# Patient Record
Sex: MALE | Race: WHITE | URBAN - METROPOLITAN AREA
[De-identification: names, ages, dates, MRNs, and addresses within clinical notes are randomized per-mention and may not be internally consistent; named-entity substitution may affect disease eponyms.]

---

## 2017-04-19 ENCOUNTER — OFFICE VISIT (OUTPATIENT)
Dept: FAMILY MEDICINE | Facility: CLINIC | Age: 82
End: 2017-04-19
Payer: MEDICARE

## 2017-04-19 VITALS
HEIGHT: 71 IN | WEIGHT: 177 LBS | OXYGEN SATURATION: 99 % | TEMPERATURE: 98.4 F | DIASTOLIC BLOOD PRESSURE: 80 MMHG | BODY MASS INDEX: 24.78 KG/M2 | HEART RATE: 90 BPM | SYSTOLIC BLOOD PRESSURE: 136 MMHG

## 2017-04-19 DIAGNOSIS — H61.20 WAX IN EAR: Primary | ICD-10-CM

## 2017-04-19 PROCEDURE — 99202 OFFICE O/P NEW SF 15 MIN: CPT | Mod: 25 | Performed by: FAMILY MEDICINE

## 2017-04-19 PROCEDURE — 69210 REMOVE IMPACTED EAR WAX UNI: CPT | Performed by: FAMILY MEDICINE

## 2017-04-19 NOTE — MR AVS SNAPSHOT
"              After Visit Summary   2017    Chuy Barrera    MRN: [id number]           Patient Information     Date Of Birth          1932        Visit Information        Provider Department      2017 1:00 PM Piero Wallace MD [facility] [place]        Today's Diagnoses     Wax in ear    -  1       Follow-ups after your visit        Who to contact     If you have questions or need follow up information about today's clinic visit or your schedule please contact [facility] [place] directly at [phone].  Normal or non-critical lab and imaging results will be communicated to you by griddighart, letter or phone within 4 business days after the clinic has received the results. If you do not hear from us within 7 days, please contact the clinic through griddighart or phone. If you have a critical or abnormal lab result, we will notify you by phone as soon as possible.  Submit refill requests through [employer] or call your pharmacy and they will forward the refill request to us. Please allow 3 business days for your refill to be completed.          Additional Information About Your Visit        MyChart Information     SportXast lets you send messages to your doctor, view your test results, renew your prescriptions, schedule appointments and more. To sign up, go to [email or web address] . Click on \"Log in\" on the left side of the screen, which will take you to the Welcome page. Then click on \"Sign up Now\" on the right side of the page.     You will be asked to enter the access code listed below, as well as some personal information. Please follow the directions to create your username and password.     Your access code is: [email or web address]  Expires: 2017  1:53 PM     Your access code will  in 90 days. If you need help or a new code, please call your [facility] or [phone].        Care EveryWhere ID     This is your Care EveryWhere ID. This could be used by other organizations to " "access your [place] medical records  [id number]        Your Vitals Were     Pulse Temperature Height Pulse Oximetry BMI (Body Mass Index)       90 98.4  F (36.9  C) (Tympanic) 5' 11\" (1.803 m) 99% 24.69 kg/m2        Blood Pressure from Last 3 Encounters:   04/19/17 136/80   02/03/12 120/64   09/27/11 136/64    Weight from Last 3 Encounters:   04/19/17 177 lb (80.3 kg)   02/03/12 165 lb (74.8 kg)   09/27/11 169 lb (76.7 kg)              We Performed the Following     REMOVE IMPACTED CERUMEN        Primary Care Provider Office Phone #    [facility] [place] [phone]       No address on file        Thank you!     Thank you for choosing [facility] [place]  for your care. Our goal is always to provide you with excellent care. Hearing back from our patients is one way we can continue to improve our services. Please take a few minutes to complete the written survey that you may receive in the mail after your visit with us. Thank you!             Your Updated Medication List - Protect others around you: Learn how to safely use, store and throw away your medicines at www.disposemymeds.org.      Notice  As of 4/19/2017  1:53 PM    You have not been prescribed any medications.      "

## 2017-04-19 NOTE — NURSING NOTE
"Chief Complaint   Patient presents with     Ear Problem       Initial /80  Pulse 90  Temp 98.4  F (36.9  C) (Tympanic)  Ht 5' 11\" (1.803 m)  Wt 177 lb (80.3 kg)  SpO2 99%  BMI 24.69 kg/m2 Estimated body mass index is 24.69 kg/(m^2) as calculated from the following:    Height as of this encounter: 5' 11\" (1.803 m).    Weight as of this encounter: 177 lb (80.3 kg).  Medication Reconciliation: complete     Nelsy Jara CMA      "

## 2017-04-19 NOTE — PROGRESS NOTES
SUBJECTIVE:                                                    Chuy Barrera is a 84 year old male who presents to clinic today for the following health issues:      Concern - sudden hearing loss     Onset: 2 episode since Sunday    Description:   No pain    Intensity: mild    Progression of Symptoms:      Accompanying Signs & Symptoms:         Previous history of similar problem:       Precipitating factors:   Worsened by:     Alleviating factors:  Improved by:        Therapies Tried and outcome:     Problem list and histories reviewed & adjusted, as indicated.  Additional history: as documented    Patient Active Problem List   Diagnosis     DIZZINESS AND GIDDINESS     Actinic keratosis     Sebaceous cyst     CARDIOVASCULAR SCREENING; LDL GOAL LESS THAN 160     Elevated homocysteine (H)     Advanced directives, counseling/discussion     Memory change     Past Surgical History:   Procedure Laterality Date     SURGICAL HISTORY OF -   4/30/99    Wide excision chest lesion     SURGICAL HISTORY OF -   4/20/99    Bx chest lesion, Dx melanoma     SURGICAL HISTORY OF -       s/p Hernia repair     SURGICAL HISTORY OF -   12/5/00    Bx chest lesion scar, neg.       Social History   Substance Use Topics     Smoking status: Never Smoker     Smokeless tobacco: Not on file     Alcohol use Yes      Comment: occas     Family History   Problem Relation Age of Onset     C.A.D. Maternal Grandfather          No current outpatient prescriptions on file.     No Known Allergies  Recent Labs   Lab Test  09/16/11   1009   A1C  5.4   LDL  114   HDL  63   TRIG  57   ALT  23   CR  0.76   GFRESTIMATED  >90   GFRESTBLACK  >90   POTASSIUM  4.6   TSH  1.36      BP Readings from Last 3 Encounters:   04/19/17 136/80   02/03/12 120/64   09/27/11 136/64    Wt Readings from Last 3 Encounters:   04/19/17 177 lb (80.3 kg)   02/03/12 165 lb (74.8 kg)   09/27/11 169 lb (76.7 kg)                    Reviewed and updated as needed this visit by clinical  "staff       Reviewed and updated as needed this visit by Provider         ROS:  Constitutional, HEENT, cardiovascular, pulmonary, gi and gu systems are negative, except as otherwise noted.    OBJECTIVE:                                                    /80  Pulse 90  Temp 98.4  F (36.9  C) (Tympanic)  Ht 5' 11\" (1.803 m)  Wt 177 lb (80.3 kg)  SpO2 99%  BMI 24.69 kg/m2  Body mass index is 24.69 kg/(m^2).  GENERAL: healthy, alert and no distress  HENT: normal cephalic/atraumatic, both ears: occluded with wax, nose and mouth without ulcers or lesions, oropharynx clear and oral mucous membranes moist  NECK: no adenopathy, no asymmetry, masses, or scars and thyroid normal to palpation  RESP: lungs clear to auscultation - no rales, rhonchi or wheezes  CV: regular rate and rhythm, normal S1 S2, no S3 or S4, no murmur, click or rub, no peripheral edema and peripheral pulses strong  ABDOMEN: soft, nontender, no hepatosplenomegaly, no masses and bowel sounds normal  MS: no gross musculoskeletal defects noted, no edema         ASSESSMENT/PLAN:                                                    Chuy was seen today for ear problem.    Diagnoses and all orders for this visit:    Wax in ear  -     REMOVE IMPACTED CERUMEN      Bilateral wax impacted, removed with ENT alligator forceps and flushed with water without complication   Encouraged him to keep using debrox qod until hearing improving     Piero Wallace MD  [facility]  "

## 2017-09-20 ENCOUNTER — OFFICE VISIT (OUTPATIENT)
Dept: FAMILY MEDICINE | Facility: CLINIC | Age: 82
End: 2017-09-20
Payer: MEDICARE

## 2017-09-20 VITALS
BODY MASS INDEX: 24.14 KG/M2 | DIASTOLIC BLOOD PRESSURE: 85 MMHG | HEART RATE: 73 BPM | WEIGHT: 172.4 LBS | TEMPERATURE: 98 F | RESPIRATION RATE: 16 BRPM | HEIGHT: 71 IN | SYSTOLIC BLOOD PRESSURE: 124 MMHG | OXYGEN SATURATION: 97 %

## 2017-09-20 DIAGNOSIS — Z23 NEED FOR PROPHYLACTIC VACCINATION AND INOCULATION AGAINST INFLUENZA: ICD-10-CM

## 2017-09-20 DIAGNOSIS — G30.1 LATE ONSET ALZHEIMER'S DISEASE WITH BEHAVIORAL DISTURBANCE (H): ICD-10-CM

## 2017-09-20 DIAGNOSIS — Z12.11 SCREEN FOR COLON CANCER: ICD-10-CM

## 2017-09-20 DIAGNOSIS — F02.818 LATE ONSET ALZHEIMER'S DISEASE WITH BEHAVIORAL DISTURBANCE (H): ICD-10-CM

## 2017-09-20 DIAGNOSIS — Z23 NEED FOR VACCINATION: ICD-10-CM

## 2017-09-20 DIAGNOSIS — Z00.00 HEALTH CARE MAINTENANCE: Primary | ICD-10-CM

## 2017-09-20 DIAGNOSIS — Z12.5 SCREENING FOR PROSTATE CANCER: ICD-10-CM

## 2017-09-20 LAB
ERYTHROCYTE [DISTWIDTH] IN BLOOD BY AUTOMATED COUNT: 13.8 % (ref 10–15)
HCT VFR BLD AUTO: 47 % (ref 40–53)
HGB BLD-MCNC: 15.6 G/DL (ref 13.3–17.7)
MCH RBC QN AUTO: 31.4 PG (ref 26.5–33)
MCHC RBC AUTO-ENTMCNC: 33.2 G/DL (ref 31.5–36.5)
MCV RBC AUTO: 95 FL (ref 78–100)
PLATELET # BLD AUTO: 239 10E9/L (ref 150–450)
RBC # BLD AUTO: 4.97 10E12/L (ref 4.4–5.9)
WBC # BLD AUTO: 6.9 10E9/L (ref 4–11)

## 2017-09-20 PROCEDURE — 90662 IIV NO PRSV INCREASED AG IM: CPT | Performed by: FAMILY MEDICINE

## 2017-09-20 PROCEDURE — G0438 PPPS, INITIAL VISIT: HCPCS | Performed by: FAMILY MEDICINE

## 2017-09-20 PROCEDURE — 90670 PCV13 VACCINE IM: CPT | Performed by: FAMILY MEDICINE

## 2017-09-20 PROCEDURE — G0008 ADMIN INFLUENZA VIRUS VAC: HCPCS | Performed by: FAMILY MEDICINE

## 2017-09-20 PROCEDURE — 80053 COMPREHEN METABOLIC PANEL: CPT | Performed by: FAMILY MEDICINE

## 2017-09-20 PROCEDURE — 36415 COLL VENOUS BLD VENIPUNCTURE: CPT | Performed by: FAMILY MEDICINE

## 2017-09-20 PROCEDURE — 83721 ASSAY OF BLOOD LIPOPROTEIN: CPT | Performed by: FAMILY MEDICINE

## 2017-09-20 PROCEDURE — G0103 PSA SCREENING: HCPCS | Performed by: FAMILY MEDICINE

## 2017-09-20 PROCEDURE — 85027 COMPLETE CBC AUTOMATED: CPT | Performed by: FAMILY MEDICINE

## 2017-09-20 PROCEDURE — G0009 ADMIN PNEUMOCOCCAL VACCINE: HCPCS | Performed by: FAMILY MEDICINE

## 2017-09-20 NOTE — PROGRESS NOTES
SUBJECTIVE:   Chuy Barrera is a 85 year old male who presents for Preventive Visit.    Are you in the first 12 months of your Medicare Part B coverage?  No    Healthy Habits:    Do you get at least three servings of calcium containing foods daily (dairy, green leafy vegetables, etc.)? 1    Amount of exercise or daily activities, outside of work: 0 day(s) per week    Problems taking medications regularly No    Medication side effects: No    Have you had an eye exam in the past two years? yes    Do you see a dentist twice per year? no    Do you have sleep apnea, excessive snoring or daytime drowsiness? Unsure, sleeps a lot during the day.     COGNITIVE SCREEN  1) Repeat 3 items (Banana, Sunrise, Chair)    2) Clock draw: ABNORMAL   3) 3 item recall: Recalls NO objects   Results: 0 items recalled: PROBABLE COGNITIVE IMPAIRMENT, **INFORM PROVIDER**    Mini-CogTM Copyright S Alex. Licensed by the author for use in [facility]; reprinted with permission (pato@.[facility]). All rights reserved.      Reviewed and updated as needed this visit by clinical staff         Reviewed and updated as needed this visit by Provider      Social History   Substance Use Topics     Smoking status: Never Smoker     Smokeless tobacco: Not on file     Alcohol use Yes      Comment: occas       The patient does not drink >3 drinks per day nor >7 drinks per week.    Today's PHQ-2 Score:   PHQ-2 ( 1999 Pfizer) 4/19/2017 2/3/2012   Q1: Little interest or pleasure in doing things 0 0   Q2: Feeling down, depressed or hopeless 0 0   PHQ-2 Score 0 0         Do you feel safe in your environment - Yes    Do you have a Health Care Directive?: Yes: Advance Directive has been received and scanned. 2012    Current providers sharing in care for this patient include:   Patient Care Team:  Denice Ophir Emily as PCP - General      Hearing impairment: Yes, Difficulty following a conversation in a noisy restaurant or crowded  room.    Ability to successfully perform activities of daily living: No, needs assistance with: phone, transportation, shopping, preparing meals, housework, bathing, laundry, medications and managing money     Fall risk:  Fallen 2 or more times in the past year?: No  Any fall with injury in the past year?: No      Home safety:  none identified      The following health maintenance items are reviewed in Epic and correct as of today:  Health Maintenance   Topic Date Due     TETANUS IMMUNIZATION (SYSTEM ASSIGNED)  09/05/1950     FALL RISK ASSESSMENT  09/05/1997     PNEUMOCOCCAL (1 of 2 - PCV13) 09/05/1997     ADVANCE DIRECTIVE PLANNING Q5 YRS  02/16/2017     INFLUENZA VACCINE (SYSTEM ASSIGNED)  09/01/2017     BP Readings from Last 3 Encounters:   09/20/17 124/85   04/19/17 136/80   02/03/12 120/64    Wt Readings from Last 3 Encounters:   09/20/17 172 lb 6.4 oz (78.2 kg)   04/19/17 177 lb (80.3 kg)   02/03/12 165 lb (74.8 kg)                  Patient Active Problem List   Diagnosis     DIZZINESS AND GIDDINESS     Actinic keratosis     Sebaceous cyst     CARDIOVASCULAR SCREENING; LDL GOAL LESS THAN 160     Elevated homocysteine (H)     Advanced directives, counseling/discussion     Memory change     Past Surgical History:   Procedure Laterality Date     SURGICAL HISTORY OF -   4/30/99    Wide excision chest lesion     SURGICAL HISTORY OF -   4/20/99    Bx chest lesion, Dx melanoma     SURGICAL HISTORY OF -       s/p Hernia repair     SURGICAL HISTORY OF -   12/5/00    Bx chest lesion scar, neg.       Social History   Substance Use Topics     Smoking status: Never Smoker     Smokeless tobacco: Not on file     Alcohol use Yes      Comment: occas     Family History   Problem Relation Age of Onset     C.A.D. Maternal Grandfather          No current outpatient prescriptions on file.     No Known Allergies  Recent Labs   Lab Test  09/16/11   1009   A1C  5.4   LDL  114   HDL  63   TRIG  57   ALT  23   CR  0.76   GFRESTIMATED   ">90   GFRESTBLACK  >90   POTASSIUM  4.6   TSH  1.36              Pneumonia Vaccine:Adults age 65+ who received Pneumovax (PPSV23) at 65 years or older: Should be given PCV13 > 1 year after their most recent PPSV23    ROS:  Constitutional, HEENT, cardiovascular, pulmonary, gi and gu systems are negative, except as otherwise noted.      OBJECTIVE:   /85 (BP Location: Left arm, Patient Position: Chair, Cuff Size: Adult Regular)  Pulse 73  Temp 98  F (36.7  C) (Tympanic)  Resp 16  Ht 5' 11\" (1.803 m)  Wt 172 lb 6.4 oz (78.2 kg)  SpO2 97%  BMI 24.04 kg/m2 Estimated body mass index is 24.04 kg/(m^2) as calculated from the following:    Height as of this encounter: 5' 11\" (1.803 m).    Weight as of this encounter: 172 lb 6.4 oz (78.2 kg).  EXAM:   GENERAL: healthy, alert and no distress  EYES: Eyes grossly normal to inspection, PERRL and conjunctivae and sclerae normal  HENT: ear canals and TM's normal, nose and mouth without ulcers or lesions  NECK: no adenopathy, no asymmetry, masses, or scars and thyroid normal to palpation  RESP: lungs clear to auscultation - no rales, rhonchi or wheezes  CV: regular rate and rhythm, normal S1 S2, no S3 or S4, no murmur, click or rub, no peripheral edema and peripheral pulses strong  ABDOMEN: soft, nontender, no hepatosplenomegaly, no masses and bowel sounds normal  MS: no gross musculoskeletal defects noted, no edema  BACK: no CVA tenderness, no paralumbar tenderness  PSYCH: mentation appears normal, affect normal/bright  LYMPH: no cervical, supraclavicular, axillary, or inguinal adenopathy    ASSESSMENT / PLAN:       ICD-10-CM    1. Health care maintenance Z00.00 CBC with platelets     Comprehensive metabolic panel (BMP + Alb, Alk Phos, ALT, AST, Total. Bili, TP)     PSA, screen     LDL cholesterol direct   2. Screening for prostate cancer Z12.5 PSA, screen   3. Screen for colon cancer Z12.11 Fecal colorectal cancer screen (FIT)   4. 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  Practitioner is supportive of decision.    COUNSELING:  Reviewed preventive health counseling, as reflected in patient instructions        Estimated body mass index is 24.69 kg/(m^2) as calculated from the following:    Height as of 4/19/17: 5' 11\" (1.803 m).    Weight as of 4/19/17: 177 lb (80.3 kg).     reports that he has never smoked. He does not have any smokeless tobacco history on file.        Appropriate preventive services were discussed with this patient, including applicable screening as appropriate for cardiovascular disease, diabetes, osteopenia/osteoporosis, and glaucoma.  As appropriate for age/gender, discussed screening for colorectal cancer, prostate cancer, breast cancer, and cervical cancer. Checklist reviewing preventive services available has been given to the patient.    Reviewed patients plan of care and provided an AVS. The Basic Care Plan (routine screening as documented in Health Maintenance) for Chuy meets the Care Plan requirement. This Care Plan has been established and reviewed with the Patient.    Counseling Resources:  ATP IV Guidelines  Pooled Cohorts Equation Calculator  Breast Cancer Risk Calculator  FRAX Risk Assessment  ICSI Preventive Guidelines  Dietary Guidelines for Americans, 2010  [employer] MyPlate  ASA Prophylaxis  Lung CA Screening    Piero Wallace MD  [facility]  "

## 2017-09-20 NOTE — LETTER
September 21, 2017      Chuy Barrera  [address]  [place] [address]    Dear Chuy and Allison,     Your lab results including complete blood cell counts/electrolyte balance and glucose/liver and kidney function/LDL cholesterol were all normal.  However, your prostate specific antigen is elevated around the borderline. I strongly recommend you to recheck it in 6 months for follow-up with us.    Thank you,                               Piero Wallace MD

## 2017-09-20 NOTE — MR AVS SNAPSHOT
After Visit Summary   9/20/2017    Chuy Barrera    MRN: [id number]           Patient Information     Date Of Birth          9/5/1932        Visit Information        Provider Department      9/20/2017 11:00 AM Piero Wallace MD [facility]        Today's [facility]     Health care maintenance    -  1    Screening for prostate cancer        Screen for colon cancer        Late onset Alzheimer's disease with behavioral disturbance        Need for vaccination        Need for prophylactic vaccination and inoculation against influenza          Care Instructions      Preventive Health Recommendations:       Male Ages 65 and over    Yearly exam:             See your health care provider every year in order to  o   Review health changes.   o   Discuss preventive care.    o   Review your medicines if your doctor has prescribed any.    Talk with your health care provider about whether you should have a test to screen for prostate cancer (PSA).    Every 3 years, have a diabetes test (fasting glucose). If you are at risk for diabetes, you should have this test more often.    Every 5 years, have a cholesterol test. Have this test more often if you are at risk for high cholesterol or heart disease.     Every 10 years, have a colonoscopy. Or, have a yearly FIT test (stool test). These exams will check for colon cancer.    Talk to with your health care provider about screening for Abdominal Aortic Aneurysm if you have a family history of AAA or have a history of smoking.  Shots:     Get a flu shot each year.     Get a tetanus shot every 10 years.     Talk to your doctor about your pneumonia vaccines. There are now two you should receive - Pneumovax (PPSV 23) and Prevnar (PCV 13).    Talk to your doctor about a shingles vaccine.     Talk to your doctor about the hepatitis B vaccine.  Nutrition:     Eat at least 5 servings of fruits and vegetables each day.     Eat whole-grain bread, whole-wheat pasta  and brown rice instead of white grains and rice.     Talk to your doctor about Calcium and Vitamin D.   Lifestyle    Exercise for at least 150 minutes a week (30 minutes a day, 5 days a week). This will help you control your weight and prevent disease.     Limit alcohol to one drink per day.     No smoking.     Wear sunscreen to prevent skin cancer.     See your dentist every six months for an exam and cleaning.     See your eye doctor every 1 to 2 years to screen for conditions such as glaucoma, macular degeneration and cataracts.  Preventive Health Recommendations:   Male Ages 65 and over    Yearly exam:             See your health care provider every year in order to  o   Review health changes.   o   Discuss preventive care.    o   Review your medicines if your doctor has prescribed any.  Talk with your health care provider about whether you should have a test to screen for prostate cancer (PSA).  Every 3 years, have a diabetes test (fasting glucose). If you are at risk for diabetes, you should have this test more often.  Every 5 years, have a cholesterol test. Have this test more often if you are at risk for high cholesterol or heart disease.   Every 10 years, have a colonoscopy. Or, have a yearly FIT test (stool test). These exams will check for colon cancer.  Talk to with your health care provider about screening for Abdominal Aortic Aneurysm if you have a family history of AAA or have a history of smoking.    Shots:   Get a flu shot each year.   Get a tetanus shot every 10 years.   Talk to your doctor about your pneumonia vaccines. There are now two you should receive - Pneumovax (PPSV 23) and Prevnar (PCV 13).   Talk to your doctor about a shingles vaccine.   Talk to your doctor about the hepatitis B vaccine.  Nutrition:   Eat at least 5 servings of fruits and vegetables each day.   Eat whole-grain bread, whole-wheat pasta and brown rice instead of white grains and rice.   Talk to your provider about Calcium  "and Vitamin D.   Lifestyle  Exercise for at least 150 minutes a week (30 minutes a day, 5 days a week). This will help you control your weight and prevent disease.   Limit alcohol to one drink per day.   No smoking.   Wear sunscreen to prevent skin cancer.   See your dentist every six months for an exam and cleaning.   See your eye doctor every 1 to 2 years to screen for conditions such as glaucoma, macular degeneration, cataracts, etc           Follow-ups after your visit        Future tests that were ordered for you today     Open Future Orders        Priority Expected Expires Ordered    Fecal colorectal cancer screen (FIT) Routine 10/11/2017 12/13/2017 9/20/2017            Who to contact     If you have questions or need follow up information about today's clinic visit or your schedule please contact [facility] [place] directly at [phone].  Normal or non-critical lab and imaging results will be communicated to you by phorushart, letter or phone within 4 business days after the clinic has received the results. If you do not hear from us within 7 days, please contact the clinic through phorushart or phone. If you have a critical or abnormal lab result, we will notify you by phone as soon as possible.  Submit refill requests through [employer] or call your pharmacy and they will forward the refill request to us. Please allow 3 business days for your refill to be completed.          Additional Information About Your Visit        [employer] Information     [employer] lets you send messages to your doctor, view your test results, renew your prescriptions, schedule appointments and more. To sign up, go to [email or web address] . Click on \"Log in\" on the left side of the screen, which will take you to the Welcome page. Then click on \"Sign up Now\" on the right side of the page.     You will be asked to enter the access code listed below, as well as some personal information. Please follow the directions to create your " "username and password.     Your access code is: [email or web address]  Expires: 2017  1:54 PM     Your access code will  in 90 days. If you need help or a new code, please call your [place] clinic or [phone].        Care EveryWhere ID     This is your Care EveryWhere ID. This could be used by other organizations to access your [place] medical records  [id number]        Your Vitals Were     Pulse Temperature Respirations Height Pulse Oximetry BMI (Body Mass Index)    73 98  F (36.7  C) (Tympanic) 16 5' 11\" (1.803 m) 97% 24.04 kg/m2       Blood Pressure from Last 3 Encounters:   17 124/85   17 136/80   12 120/64    Weight from Last 3 Encounters:   17 172 lb 6.4 oz (78.2 kg)   17 177 lb (80.3 kg)   12 165 lb (74.8 kg)              We Performed the Following     1st  Administration  [69007]     ADMIN INFLUENZA (For MEDICARE Patients ONLY) []     CBC with platelets     Comprehensive metabolic panel (BMP + Alb, Alk Phos, ALT, AST, Total. Bili, TP)     FLU VACCINE, INCREASED ANTIGEN, PRESV FREE, AGE 65+ [10011]     LDL cholesterol direct     Pneumococcal vaccine 13 valent PCV13 IM (Prevnar) [28478]     PSA, screen        Primary Care Provider Office Phone #    [facility] [phone]       No address on file        Equal Access to Services     YOKASTA RUCKER : Hadii aad ku hadasho Soomaali, waaxda luqadaha, qaybta kaalmada adeegyada, rafael garcía adesudarshan ruvalcaba . So [facility] [phone].    ATENCIÓN: Si habla español, tiene a griffin disposición servicios gratuitos de asistencia lingüística. Llame al [phone].    We comply with applicable federal civil rights laws and [place] laws. We do not discriminate on the basis of race, color, national origin, age, disability sex, sexual orientation or gender identity.            Thank you!     Thank you for choosing [facility] [place]  for your care. Our goal is always to provide you with " excellent care. Hearing back from our patients is one way we can continue to improve our services. Please take a few minutes to complete the written survey that you may receive in the mail after your visit with us. Thank you!             Your Updated Medication List - Protect others around you: Learn how to safely use, store and throw away your medicines at www.disposemymeds.org.      Notice  As of 9/20/2017  1:54 PM    You have not been prescribed any medications.

## 2017-09-20 NOTE — NURSING NOTE
"Chief Complaint   Patient presents with     Physical       Initial /85 (BP Location: Left arm, Patient Position: Chair, Cuff Size: Adult Regular)  Pulse 73  Temp 98  F (36.7  C) (Tympanic)  Resp 16  Ht 5' 11\" (1.803 m)  Wt 172 lb 6.4 oz (78.2 kg)  SpO2 97%  BMI 24.04 kg/m2 Estimated body mass index is 24.04 kg/(m^2) as calculated from the following:    Height as of this encounter: 5' 11\" (1.803 m).    Weight as of this encounter: 172 lb 6.4 oz (78.2 kg).  Medication Reconciliation: complete    Arin Baez MA  "

## 2017-09-20 NOTE — PROGRESS NOTES
Injectable Influenza Immunization Documentation    1.  Is the person to be vaccinated sick today?   No    2. Does the person to be vaccinated have an allergy to a component   of the vaccine?   Don't know    3. Has the person to be vaccinated ever had a serious reaction   to influenza vaccine in the past?   No    4. Has the person to be vaccinated ever had Guillain-Barré syndrome?   No    Form completed by Arin Baez MA

## 2017-09-21 LAB
ALBUMIN SERPL-MCNC: 4.1 G/DL (ref 3.4–5)
ALP SERPL-CCNC: 99 U/L (ref 40–150)
ALT SERPL W P-5'-P-CCNC: 29 U/L (ref 0–70)
ANION GAP SERPL CALCULATED.3IONS-SCNC: 9 MMOL/L (ref 3–14)
AST SERPL W P-5'-P-CCNC: 16 U/L (ref 0–45)
BILIRUB SERPL-MCNC: 1.6 MG/DL (ref 0.2–1.3)
BUN SERPL-MCNC: 16 MG/DL (ref 7–30)
CALCIUM SERPL-MCNC: 9.5 MG/DL (ref 8.5–10.1)
CHLORIDE SERPL-SCNC: 105 MMOL/L (ref 94–109)
CO2 SERPL-SCNC: 26 MMOL/L (ref 20–32)
CREAT SERPL-MCNC: 1.04 MG/DL (ref 0.66–1.25)
GFR SERPL CREATININE-BSD FRML MDRD: 68 ML/MIN/1.7M2
GLUCOSE SERPL-MCNC: 91 MG/DL (ref 70–99)
LDLC SERPL DIRECT ASSAY-MCNC: 87 MG/DL
POTASSIUM SERPL-SCNC: 4.5 MMOL/L (ref 3.4–5.3)
PROT SERPL-MCNC: 7.8 G/DL (ref 6.8–8.8)
PSA SERPL-ACNC: 7.55 UG/L (ref 0–4)
SODIUM SERPL-SCNC: 140 MMOL/L (ref 133–144)

## 2018-01-11 ENCOUNTER — TELEPHONE (OUTPATIENT)
Dept: FAMILY MEDICINE | Facility: CLINIC | Age: 83
End: 2018-01-11

## 2018-01-11 NOTE — TELEPHONE ENCOUNTER
Form for guardianship from aLyton and HERMELINDA Babcock received and given to Dr Wallace to review.   Kelin Burdick,

## 2018-01-16 ENCOUNTER — APPOINTMENT (OUTPATIENT)
Dept: GENERAL RADIOLOGY | Facility: CLINIC | Age: 83
DRG: 871 | End: 2018-01-16
Attending: EMERGENCY MEDICINE
Payer: MEDICARE

## 2018-01-16 ENCOUNTER — HOSPITAL ENCOUNTER (INPATIENT)
Facility: CLINIC | Age: 83
LOS: 1 days | Discharge: HOME HEALTH | DRG: 871 | End: 2018-01-18
Attending: EMERGENCY MEDICINE | Admitting: INTERNAL MEDICINE
Payer: MEDICARE

## 2018-01-16 DIAGNOSIS — F03.918 SENILE DEMENTIA, WITH BEHAVIORAL DISTURBANCE (H): Primary | ICD-10-CM

## 2018-01-16 DIAGNOSIS — A41.9 SEPSIS, DUE TO UNSPECIFIED ORGANISM: ICD-10-CM

## 2018-01-16 DIAGNOSIS — R53.81 PHYSICAL DECONDITIONING: ICD-10-CM

## 2018-01-16 DIAGNOSIS — J18.9 PNEUMONIA OF RIGHT LOWER LOBE DUE TO INFECTIOUS ORGANISM: ICD-10-CM

## 2018-01-16 LAB
HEMOCCULT STL QL: NEGATIVE
LACTATE BLD-SCNC: 3.3 MMOL/L (ref 0.7–2)

## 2018-01-16 PROCEDURE — 25000128 H RX IP 250 OP 636: Performed by: EMERGENCY MEDICINE

## 2018-01-16 PROCEDURE — 99285 EMERGENCY DEPT VISIT HI MDM: CPT | Mod: 25

## 2018-01-16 PROCEDURE — 83605 ASSAY OF LACTIC ACID: CPT | Performed by: EMERGENCY MEDICINE

## 2018-01-16 PROCEDURE — 85025 COMPLETE CBC W/AUTO DIFF WBC: CPT | Performed by: EMERGENCY MEDICINE

## 2018-01-16 PROCEDURE — 71046 X-RAY EXAM CHEST 2 VIEWS: CPT

## 2018-01-16 PROCEDURE — 93005 ELECTROCARDIOGRAM TRACING: CPT

## 2018-01-16 PROCEDURE — 36415 COLL VENOUS BLD VENIPUNCTURE: CPT

## 2018-01-16 PROCEDURE — 82272 OCCULT BLD FECES 1-3 TESTS: CPT | Performed by: EMERGENCY MEDICINE

## 2018-01-16 PROCEDURE — 80053 COMPREHEN METABOLIC PANEL: CPT | Performed by: EMERGENCY MEDICINE

## 2018-01-16 RX ADMIN — SODIUM CHLORIDE 1000 ML: 9 INJECTION, SOLUTION INTRAVENOUS at 23:20

## 2018-01-16 NOTE — IP AVS SNAPSHOT
"` `           [place] [address] [facility]: [phone]                                              INTERAGENCY TRANSFER FORM - NURSING   2018                    Hospital Admission Date: 2018  JORDEN GARZA   : 1932  Sex: Male        Attending Provider: Andrea Mcgarry MD     Allergies:  No Known Allergies    Infection:  None   Service:  HOSPITALIST    Ht:  1.905 m (6' 3\")   Wt:  78 kg (172 lb)   Admission Wt:  78 kg (172 lb)    BMI:  21.5 kg/m 2   BSA:  2.03 m 2            Patient PCP Information     Provider PCP Type    Piero Wallace MD General      Current Code Status     Date Active Code Status Order ID Comments User Context       Prior      Code Status History     Date Active Date Inactive Code Status Order ID Comments User Context    2018 10:12 AM  DNR/DNI [id number]  Radha Beckford MD Outpatient    2018  2:56 AM 2018 10:12 AM DNR/DNI [id number]  Andrea Mcgarry MD Inpatient    2012  2:42 PM 2018  2:56 AM Special Code [id number] Received outside advance directive.  Please see advance directive for specifics. Aicha Cobos, RN   Aich[facility] Cobos Outpatient      Advance Directives        Does patient have a scanned Advance Directive/ACP document in EPIC?           Yes        Hospital Problems as of 2018              Priority Class Noted POA    Pneumonia Medium  2018 Yes      Non-Hospital Problems as of 2018              Priority Class Noted    DIZZINESS AND GIDDINESS Medium  2007    Actinic keratosis Medium  2007    Sebaceous cyst Medium  3/10/2008    CARDIOVASCULAR SCREENING; LDL GOAL LESS THAN 160 Medium  10/31/2010    Elevated homocysteine (H) Medium  2011    Advanced directives, counseling/discussion Medium  2/3/2012    Memory change Medium  2/3/2012    Late onset Alzheimer's disease with behavioral disturbance Medium  2017      Immunizations     Name Date      Influenza (High Dose) 3 " "valent vaccine 09/20/17     Pneumo Conj 13-V (2010&after) 09/20/17          END      ASSESSMENT     Discharge Profile Flowsheet     EXPECTED DISCHARGE     SKIN      Expected Discharge Date  01/18/18 01/17/18 1551   Inspection of bony prominences  Full 01/18/18 1032    DISCHARGE NEEDS ASSESSMENT     Inspection under devices  Full 01/18/18 1032    # of Referrals Placed by CTS  Post Acute Facilities 01/18/18 0928   Skin WDL  ex 01/18/18 1032    GASTROINTESTINAL (ADULT,PEDIATRIC,OB)     Skin Color/Characteristics  rafaela 01/18/18 1032    GI WDL  ex 01/18/18 1032   Skin Integrity  abrasion(s);rash(es) 01/18/18 1032    Last Bowel Movement  01/17/18 01/17/18 1053   SAFETY      GI Signs/Symptoms  fecal incontinence 01/18/18 1032   Safety WDL  WDL 01/18/18 1032    Passing flatus  yes 01/18/18 1032   Safety Factors  side rails raised x 3;bed in low position;wheels locked;call light in reach;ID band on 01/18/18 0132    COMMUNICATION ASSESSMENT     All Alarms  alarm(s) activated and audible 01/18/18 1032    Patient's communication style  spoken language (English or Bilingual) 01/17/18 0246                      Assessment WDL (Within Defined Limits) Definitions           Safety WDL     Effective: 09/28/15    Row Information: <b>WDL Definition:</b> Bed in low position, wheels locked; call light in reach; upper side rails up x 2; ID band on<br> <font color=\"gray\"><i>Item=AS safety wdl>>List=AS safety wdl>>Version=F14</i></font>      Skin WDL     Effective: 09/28/15    Row Information: <b>WDL Definition:</b> Warm; dry; intact; elastic; without discoloration; pressure points without redness<br> <font color=\"gray\"><i>Item=AS skin wdl>>List=AS skin wdl>>Version=F14</i></font>      Vitals     Vital Signs Flowsheet     VITAL SIGNS     Weight  78 kg (172 lb) 01/17/18 0130    Temp  98.9  F (37.2  C) 01/18/18 0739   Estimated Weight (From ED)  77.1 kg (170 lb) 01/16/18 2305    Temp src  Oral 01/18/18 0739   ELLA COMA SCALE      Resp  18 " "01/18/18 0739   Best Eye Response  4-->(E4) spontaneous 01/17/18 0331    Heart Rate  63 01/18/18 0739   Best Motor Response  6-->(M6) obeys commands 01/17/18 0331    Pulse/Heart Rate Source  Monitor 01/18/18 0124   Best Verbal Response  4-->(V4) confused 01/17/18 0331    BP  150/86 01/18/18 0739   [place] Coma Scale Score  14 01/17/18 0331    BP Location  Right arm 01/18/18 0739   POSITIONING      OXYGEN THERAPY     Body Position  independently positioning 01/18/18 0837    SpO2  94 % 01/18/18 0739   Head of Bed (HOB)  HOB at 30-45 degrees 01/18/18 0837    O2 Device  None (Room air) 01/18/18 0739   Positioning/Transfer Devices  pillows 01/18/18 0837    PAIN/COMFORT     Chair  Upright in chair 01/17/18 1255    Patient Currently in Pain  denies 01/18/18 1024   DAILY CARE      HEIGHT AND WEIGHT     Activity Management  activity adjusted per tolerance 01/18/18 0837    Height  1.905 m (6' 3\") 01/16/18 2305   Activity Assistance Provided  assistance, 2 people 01/18/18 0837    Height Method  Stated 01/17/18 0130                 Patient Lines/Drains/Airways Status    Active LINES/DRAINS/AIRWAYS     Name: Placement date: Placement time: Site: Days: Last dressing change:    Wound 01/17/18 Right;Upper Back Other (comment) Bleeding mole 01/17/18   0700   Back   1             Patient Lines/Drains/Airways Status    Active PICC/CVC     None            Intake/Output Detail Report     Date Intake   Output Net    Shift I.V. IV Piggyback Total Urine Total       Noc 01/16/18 2300 - 01/17/18 0659 292 3050 3342     Day 01/17/18 0700 - 01/17/18 1459 -- -- -- -- -- 0    Marixa 01/17/18 1500 - 01/17/18 2259 -- -- -- -- -- 0    Noc 01/17/18 2300 - 01/18/18 0659 -- -- -- -- -- 0    Day 01/18/18 0700 - 01/18/18 1459 -- -- -- 125 125 -125      Last Void/BM       Most Recent Value    Urine Occurrence 1 at 01/18/2018 1017    Stool Occurrence 1 at 01/17/2018 2660      Case Management/Discharge Planning     Case Management/Discharge " Planning Flowsheet     REFERRAL INFORMATION     ABUSE RISK SCREEN      # of Referrals Placed by CTS  Post Acute Facilities 01/18/18 0925   QUESTION TO PATIENT:  Has a member of your family or a partner(now or in the past) intimidated, hurt, manipulated, or controlled you in any way?  no 01/16/18 2311    CTS Assigned to Case  reddy miles 01/17/18 1638   QUESTION TO PATIENT: Do you feel safe going back to the place where you are living?  yes 01/16/18 2311    LIVING ENVIRONMENT     OBSERVATION: Is there reason to believe there has been maltreatment of a vulnerable adult (ie. Physical/Sexual/Emotional abuse, self neglect, lack of adequate food, shelter, medical care, or financial exploitation)?  no 01/16/18 2311    Lives With  spouse 01/18/18 1036   (R) MENTAL HEALTH SUICIDE RISK      COPING/STRESS     Are you depressed or being treated for depression?  Yes (no dx) 01/17/18 0253    Major Change/Loss/Stressor  none 01/17/18 0253   HOMICIDE RISK      EXPECTED DISCHARGE     Feels Like Hurting Others  no 01/16/18 2311    Expected Discharge Date  01/18/18 01/17/18 7780

## 2018-01-16 NOTE — IP AVS SNAPSHOT
` `     [place] [address] [facility]: [phone]            Medication Administration Report for Chuy Barrera as of 01/18/18 1140   Legend:    Given Hold Not Given Due Canceled Entry Other Actions    Time Time (Time) Time  Time-Action       Inactive    Active    Linked        Medications 01/12/18 01/13/18 01/14/18 01/15/18 01/16/18 01/17/18 01/18/18    acetaminophen (TYLENOL) Suppository 650 mg  Dose: 650 mg Freq: EVERY 4 HOURS PRN Route: RE  PRN Reason: mild pain  Start: 01/17/18 0256   Admin Instructions: Alternate ibuprofen (if ordered) with acetaminophen.  Maximum acetaminophen dose from all sources = 75 mg/kg/day not to exceed 4 grams/day.               acetaminophen (TYLENOL) tablet 650 mg  Dose: 650 mg Freq: EVERY 4 HOURS PRN Route: PO  PRN Reason: mild pain  Start: 01/17/18 0256   Admin Instructions: Alternate ibuprofen (if ordered) with acetaminophen.  Maximum acetaminophen dose from all sources = 75 mg/kg/day not to exceed 4 grams/day.               amoxicillin-clavulanate (AUGMENTIN) 400-57 MG/5ML suspension 875 mg  Dose: 875 mg Freq: EVERY 12 HOURS SCHEDULED Route: PO  Indications of Use: ASPIRATION PNEUMONIA  Start: 01/17/18 1415         1453 (875 mg)-Given       2105 (875 mg)-Given        0836 (875 mg)-Given       [ ] 2000           enoxaparin (LOVENOX) injection 40 mg  Dose: 40 mg Freq: EVERY 24 HOURS Route: SC  Start: 01/17/18 0300   Admin Instructions: HOLD if platelet count falls below 50% of baseline or less than 100,000/ L and notify provider.          0316 (40 mg)-Given        0309 (40 mg)-Given           magnesium citrate solution 148 mL  Dose: 148 mL Freq: ONCE PRN Route: PO  PRN Reason: constipation  Start: 01/17/18 0256   Admin Instructions: May repeat in one hour x 1 if insufficient results. Avoid in severe renal disease. Hold for loose stools.  This is the third step of a three step constipation treatment.               melatonin tablet 1 mg  Dose: 1 mg Freq: AT  BEDTIME PRN Route: PO  PRN Reason: sleep  Start: 01/17/18 0256   Admin Instructions: Do not give unless at least 6 hours of uninterrupted sleep is expected.               naloxone (NARCAN) injection 0.1-0.4 mg  Dose: 0.1-0.4 mg Freq: EVERY 2 MIN PRN Route: IV  PRN Reason: opioid reversal  Start: 01/17/18 0256   Admin Instructions: For respiratory rate LESS than or EQUAL to 8.  Partial reversal dose:  0.1 mg titrated q 2 minutes for Analgesia Side Effects Monitoring Sedation Level of 3 (frequently drowsy, arousable, drifts to sleep during conversation).Full reversal dose:  0.4 mg bolus for Analgesia Side Effects Monitoring Sedation Level of 4 (somnolent, minimal or no response to stimulation).  For ordered doses up to 2mg give IVP. Give each 0.4mg over 15 seconds in emergency situations. For non-emergent situations further dilute in 9mL of NS to facilitate titration of response.               ondansetron (ZOFRAN-ODT) ODT tab 4 mg  Dose: 4 mg Freq: EVERY 6 HOURS PRN Route: PO  PRN Reasons: nausea,vomiting  Start: 01/17/18 0256   Admin Instructions: This is Step 1 of nausea and vomiting management.  If nausea not resolved in 15 minutes, go to Step 2 prochlorperazine (COMPAZINE). Do not push through foil backing. Peel back foil and gently remove. Place on tongue immediately. Administration with liquid unnecessary  With dry hands, peel back foil backing and gently remove tablet; do not push oral disintegrating tablet through foil backing; administer immediately on tongue and oral disintegrating tablet dissolves in seconds; then swallow with saliva; liquid not required.              Or  ondansetron (ZOFRAN) injection 4 mg  Dose: 4 mg Freq: EVERY 6 HOURS PRN Route: IV  PRN Reasons: nausea,vomiting  Start: 01/17/18 0256   Admin Instructions: This is Step 1 of nausea and vomiting management.  If nausea not resolved in 15 minutes, go to Step 2 prochlorperazine (COMPAZINE).  Irritant. For ordered doses up to 4 mg, give IV Push  undiluted over 2-5 minutes.               polyethylene glycol (MIRALAX/GLYCOLAX) Packet 17 g  Dose: 17 g Freq: DAILY PRN Route: PO  PRN Reason: constipation  Start: 01/17/18 0256   Admin Instructions: Give in 8oz of  water, juice, or soda. Hold for loose stools.  This is the second step of a three step constipation treatment.  1 Packet = 17 grams. Mixed prescribed dose in 8 ounces of water. Follow with 8 oz. of water.               potassium chloride (KLOR-CON) Packet 20-40 mEq  Dose: 20-40 mEq Freq: EVERY 2 HOURS PRN Route: ORAL OR FEED  PRN Reason: potassium supplementation  Start: 01/17/18 0256   Admin Instructions: Use if unable to tolerate tablets.  If Serum K+ 3.0-3.3, dose = 60 mEq po total dose (40 mEq x1 followed in 2 hours by 20 mEq x1). Recheck K+ level 4 hours after dose and the next AM.  If Serum K+ 2.5-2.9, dose = 80 mEq po total dose (40 mEq Q2H x2). Recheck K+ level 4 hours after dose and the next AM.  If Serum K+ less than 2.5, See IV order.  Dissolve packet contents in 4-8 ounces of cold water or juice.          1132 (20 mEq)-Given            potassium chloride 10 mEq in 100 mL intermittent infusion with 10 mg lidocaine  Dose: 10 mEq Freq: EVERY 1 HOUR PRN Route: IV  PRN Reason: potassium supplementation  Start: 01/17/18 0256   Admin Instructions: Infuse via PERIPHERAL LINE. Use potassium with lidocaine for pain with peripheral administration.  If Serum K+ 3.0-3.3, dose = 10 mEq/hr x4 doses (40 mEq IV total dose). Recheck K+ level 2 hours after dose and the next AM.  If Serum K+ less than 3.0, dose = 10 mEq/hr x6 doses (60 mEq IV total dose). Recheck K+ level 2 hours after dose and the next AM.          0357 (10 mEq)-New Bag       0508 (10 mEq)-New Bag       0625 (10 mEq)-New Bag            potassium chloride 10 mEq in 100 mL sterile water intermittent infusion (premix)  Dose: 10 mEq Freq: EVERY 1 HOUR PRN Route: IV  PRN Reason: potassium supplementation  Start: 01/17/18 0256   Admin Instructions:  Infuse via PERIPHERAL LINE or CENTRAL LINE. Use for central line replacement if patient weight less than 65 kg, if patient is on TPN with high potassium content or if unit does not stock 20 mEq bags.   If Serum K+ 3.0-3.3, dose = 10 mEq/hr x4 doses (40 mEq IV total dose). Recheck K+ level 2 hours after dose and the next AM.   If Serum K+ less than 3.0, dose = 10 mEq/hr x6 doses (60 mEq IV total dose). Recheck K+ level 2 hours after dose and the next AM.               potassium chloride 20 mEq in 50 mL intermittent infusion  Dose: 20 mEq Freq: EVERY 1 HOUR PRN Route: IV  PRN Reason: potassium supplementation  Start: 01/17/18 0256   Admin Instructions: Infuse via CENTRAL LINE Only. May need EKG if less than 65 kg or on TPN - Max rate is 0.3 mEq/kg/hr for patients not on EKG monitoring.   If Serum K+ 3.0-3.3, dose = 20 mEq/hr x2 doses (40 mEq IV total dose). Recheck K+ level 2 hours after dose and the next AM.  If Serum K+ less than 3.0, dose = 20 mEq/hr x3 doses (60 mEq IV total dose). Recheck K+ level 2 hours after dose and the next AM.               potassium chloride SA (K-DUR/KLOR-CON M) CR tablet 20-40 mEq  Dose: 20-40 mEq Freq: EVERY 2 HOURS PRN Route: PO  PRN Reason: potassium supplementation  Start: 01/17/18 0256   Admin Instructions: Use if able to take PO.   If Serum K+ 3.0-3.3, dose = 60 mEq po total dose (40 mEq x1 followed in 2 hours by 20 mEq x1). Recheck K+ level 4 hours after dose and the next AM.  If Serum K+ 2.5-2.9, dose = 80 mEq po total dose (40 mEq Q2H x2). Recheck K+ level 4 hours after dose and the next AM.  If Serum K+ less than 2.5, See IV order.  DO NOT CRUSH               QUEtiapine (SEROquel) tablet 25 mg  Dose: 25 mg Freq: 2 TIMES DAILY Route: PO  Start: 01/17/18 1415         1441 (25 mg)-Given       2105 (25 mg)-Given        0836 (25 mg)-Given       [ ] 2100           senna-docusate (SENOKOT-S;PERICOLACE) 8.6-50 MG per tablet 1 tablet  Dose: 1 tablet Freq: 2 TIMES DAILY PRN Route:  PO  PRN Reason: constipation  Start: 01/17/18 0256   Admin Instructions: If no bowel movement in 24 hours, increase to 2 tablets PO.  Hold for loose stools.  This is the first step of a three step constipation treatment.              Or  senna-docusate (SENOKOT-S;PERICOLACE) 8.6-50 MG per tablet 2 tablet  Dose: 2 tablet Freq: 2 TIMES DAILY PRN Route: PO  PRN Reason: constipation  Start: 01/17/18 0256   Admin Instructions: Hold for loose stools.  This is the first step of a three step constipation treatment.              Completed Medications  Medications 01/12/18 01/13/18 01/14/18 01/15/18 01/16/18 01/17/18 01/18/18         Dose: 1,000 mL Freq: ONCE Route: IV  Last Dose: Stopped (01/17/18 0101)  Start: 01/17/18 0030   End: 01/17/18 0101 0038 (1,000 mL)-New Bag       0101-Stopped              Dose: 1,000 mL Freq: ONCE Route: IV  Last Dose: Stopped (01/16/18 2352)  Start: 01/16/18 2316   End: 01/16/18 2352        2320 (1,000 mL)-New Bag       2352-Stopped               Dose: 86 mL Freq: ONCE Route: IV  Start: 01/17/18 0023   End: 01/17/18 0024         0024 (86 mL)-Given              Dose: 500 mL Freq: ONCE Route: IV  Start: 01/17/18 0133   End: 01/17/18 0135         0135 (500 mL)-New Bag              Dose: 3.375 g Freq: ONCE Route: IV  Indications of Use: HEALTHCARE-ASSOCIATED PNEUMONIA  Last Dose: Stopped (01/17/18 0105)  Start: 01/17/18 0030   End: 01/17/18 0105         0041 (3.375 g)-New Bag       0105-Stopped              Dose: 66 mL Freq: ONCE Route: IV  Start: 01/17/18 0023   End: 01/17/18 0024         0024 (66 mL)-Given              Dose: 1,750 mg Freq: ONCE Route: IV  Indications of Use: SEPSIS  Start: 01/17/18 0038   End: 01/17/18 0306   Admin Instructions: For an adult with peripheral catheter and dose of 2-2.5 g, infuse over 2 hours.  Vesicant.    For peripheral catheter: If dose between 2-2.5 g, infuse over 2 hours.    For central catheter: If dose is below 2 g, dose may be infused over 1 hour.           0106 (1,750 mg)-New Bag           Discontinued Medications  Medications 01/12/18 01/13/18 01/14/18 01/15/18 01/16/18 01/17/18 01/18/18         Rate: 100 mL/hr Freq: CONTINUOUS Route: IV  Start: 01/17/18 0300   End: 01/17/18 1354         0314 ( )-New Bag       1354-Med Discontinued          Dose: 4.5 g Freq: EVERY 6 HOURS Route: IV  Indications of Use: ASPIRATION PNEUMONIA  Indications Comment: possible psuedomonas  Start: 01/17/18 0700   End: 01/17/18 1354   Admin Instructions: FIRST DOSE STAT.  Give IVP over 3 minutes          0610 (4.5 g)-Given       1354-Med Discontinued  (1450)-Not Given

## 2018-01-16 NOTE — IP AVS SNAPSHOT
"[place] [address] [facility]: [phone]                                              INTERAGENCY TRANSFER FORM - PHYSICIAN ORDERS   2018                    Hospital Admission Date: 2018  JORDEN GARZA   : 1932  Sex: Male        Attending Provider: Andrea Mcgarry MD     Allergies:  No Known Allergies    Infection:  None   Service:  HOSPITALIST    Ht:  1.905 m (6' 3\")   Wt:  78 kg (172 lb)   Admission Wt:  78 kg (172 lb)    BMI:  21.5 kg/m 2   BSA:  2.03 m 2            Patient PCP Information     Provider PCP Type    Piero Wallace MD General      ED Clinical Impression     Diagnosis Description Comment Added By Time Added    Pneumonia of right lower lobe due to infectious organism (H) [J18.1] Pneumonia of right lower lobe due to infectious organism (H) [J18.1]  Phani Love MD 2018 12:58 AM    Sepsis, due to unspecified organism (H) [A41.9] Sepsis, due to unspecified organism (H) [A41.9]  Phani Love MD 2018 12:58 AM      Hospital Problems as of 2018              Priority Class Noted POA    Pneumonia Medium  2018 Yes      Non-Hospital Problems as of 2018              Priority Class Noted    DIZZINESS AND GIDDINESS Medium  2007    Actinic keratosis Medium  2007    Sebaceous cyst Medium  3/10/2008    CARDIOVASCULAR SCREENING; LDL GOAL LESS THAN 160 Medium  10/31/2010    Elevated homocysteine (H) Medium  2011    Advanced directives, counseling/discussion Medium  2/3/2012    Memory change Medium  2/3/2012    Late onset Alzheimer's disease with behavioral disturbance Medium  2017      Code Status History     Date Active Date Inactive Code Status Order ID Comments User Context    2018 10:12 AM  DNR/DNI [id number]  Radha Beckford MD Outpatient    2018  2:56 AM 2018 10:12 AM DNR/DNI [id number]  Andrea Mcgarry MD Inpatient    2012  2:42 PM " 1/17/2018  2:56 AM Special Code [id number] Received outside advance directive.  Please see advance directive for specifics. Aicha Cobos, RN   [facility]         Medication Review      START taking        Dose / Directions Comments    amoxicillin-clavulanate 875-125 MG per tablet   Commonly known as:  AUGMENTIN   Used for:  Pneumonia of right lower lobe due to infectious organism (H)        Dose:  1 tablet   Take 1 tablet by mouth 2 times daily for 6 days   Quantity:  12 tablet   Refills:  0        QUEtiapine 25 MG tablet   Commonly known as:  SEROquel   Used for:  Senile dementia, with behavioral disturbance        Dose:  25 mg   Take 1 tablet (25 mg) by mouth nightly as needed For agitation, insomnia   Quantity:  30 tablet   Refills:  3                Summary of Visit     Reason for your hospital stay       Aspiration pneumonia             After Care     Activity       Your activity upon discharge: activity as tolerated       Diet       Follow this diet upon discharge: Regular Diet Adult             Referrals     Home Care OT Referral for Hospital Discharge       OT to eval and treat    Your provider has ordered home care - occupational therapy. If you have not been contacted within 2 days of your discharge please call the department phone number listed on the top of this document.       Home Care PT Referral for Hospital Discharge       PT to eval and treat    Your provider has ordered home care - physical therapy. If you have not been contacted within 2 days of your discharge please call the department phone number listed on the top of this document.              MD face to face encounter       Documentation of Face to Face and Certification for Home Health Services    I certify that patient: Chuy Barrera is under my care and that I, or a nurse practitioner or physician's assistant working with me, had a face-to-face encounter that meets the physician face-to-face encounter requirements  with this patient on: January 18, 2018.    This encounter with the patient was in whole, or in part, for the following medical condition, which is the primary reason for home health care: severe dementia, aspiration pneumonia.    I certify that, based on my findings, the following services are medically necessary home health services: Occupational Therapy and Physical Therapy.    My clinical findings support the need for the above services because: Occupational Therapy Services are needed to assess and treat cognitive ability and address ADL safety due to impairment in severe dementia, aspiration pneumonia, physical deconditioning. and Physical Therapy Services are needed to assess and treat the following functional impairments: severe dementia, aspiration pneumonia, physical deconditioning.    Further, I certify that my clinical findings support that this patient is homebound (i.e. absences from home require considerable and taxing effort and are for medical reasons or [employer] services or infrequently or of short duration when for other reasons) because: Mental health symptoms including: Mental health condition is exacerbated by exposure to crowds, unfamiliar environment or new stressful situations. and Patient gets lost or wanders to due to cognitive impairments...    Based on the above findings. I certify that this patient is confined to the home and needs intermittent skilled nursing care, physical therapy and/or speech therapy.  The patient is under my care, and I have initiated the establishment of the plan of care.  This patient will be followed by a physician who will periodically review the plan of care.  Physician/Provider to provide follow up care: Piero Wallace    Attending hospital physician (the Medicare certified [place] provider): Radha Beckford MD  Physician Signature: See electronic signature associated with these discharge orders.  Date: 1/18/2018                  Follow-Up Appointment  Instructions     Future Labs/Procedures    Follow-up and recommended labs and tests      Comments:    Follow up with primary care provider, Piero Wallace, as needed.      Follow-Up Appointment Instructions     Follow-up and recommended labs and tests        Follow up with primary care provider, Piero Wallace, as needed.             Statement of Approval     Ordered          01/18/18 1002  I have reviewed and agree with all the recommendations and orders detailed in this document.  EFFECTIVE NOW     Approved and electronically signed by:  Radha Beckford MD

## 2018-01-16 NOTE — IP AVS SNAPSHOT
[facility] [address] [facility]    [address] [place] [address]    Phone:  [phone]                                       After Visit Summary   1/16/2018    Chuy Barrera    MRN: [id number]           After Visit Summary Signature Page     I have received my discharge instructions, and my questions have been answered. I have discussed any challenges I see with this plan with the nurse or doctor.    ..........................................................................................................................................  Patient/Patient Representative Signature      ..........................................................................................................................................  Patient Representative Print Name and Relationship to Patient    ..................................................               ................................................  Date                                            Time    ..........................................................................................................................................  Reviewed by Signature/Title    ...................................................              ..............................................  Date                                                            Time

## 2018-01-16 NOTE — IP AVS SNAPSHOT
` `           [place] [address] [facility]: [phone]                 INTERAGENCY TRANSFER FORM - NOTES (H&P, Discharge Summary, Consults, Procedures, Therapies)   2018                    Hospital Admission Date: 2018  CHUY GARZA   : 1932  Sex: Male        Patient PCP Information     Provider PCP Type    Piero Wallace MD General         History & Physicals      H&P by Andrea Mcgarry MD at 2018  1:52 AM     Author:  Andrea Mcgarry MD Service:  Hospitalist Author Type:  Physician    Filed:  2018  2:03 AM Date of Service:  2018  1:52 AM Creation Time:  2018  1:51 AM    Status:  Signed :  Andrea Mcgarry MD (Physician)         [facility]    History and Physical  Hospitalist       Date of Admission:  2018  Date of Service (when I saw the patient):[ES1.1] 18    Assessment & Plan[ES1.2]   Chuy Garza is a 85 year old male who presents with nausea weakness    Nausea/weakness  Pneumonia of his right lower lobe, organism unknown  Mr. Garza presented to the hospital today after having an episode of nausea and vomiting.  He normally lays on the television he watches it.  He had the episode of nausea and vomiting and after it happened his wife is unable to get him to sitting or standing position so she contacted EMS.  Evaluation in the emergency department his vitals are stable and he is afebrile.  Chest x-ray was done and showed a right lower lobe pneumonia.  CT confirmed this.  He was started on Vanco and Zosyn in the emergency department.  -Admit inpatient  -We will continue Zosyn.  Will hold on Vanco as is his limit exposure to healthcare so my suspicion for MRSA is quite low.  I am worried about aspiration which should cover.  -Oxygen as needed to keep sats over 92%  -IV hydration  -Blood cultures are pending  -I discussed at length with his wife regarding possible routes be to take with management  of Mr. Barrera's pneumonia.  With right lower lobe pneumonia given his dementia, I am worried about aspiration.  However, in speaking with his wife, she does not want aggressive measures.  She also does not want him to have to limit his diet.  Will defer speech path evaluation at this time leave the patient with regular diet.  The wife does feel that he eats fine and does not have any problems with aspiration when eating.    Dementia  This is fairly advanced and history is provided by the wife and the medical record.  He is slated to move into assisted living versus nursing home/memory care at some point in the near future.      Elevated AST/ALT and total bili  No clear explanation for this.  Again I discussed with the wife the above findings and potential pursuit of an ultrasound to see if he has gallstones.  She again did not want aggressive measures such as surgery if anything was found on the ultrasound.  Will defer the ultrasound for now.    Enlarged prostate with bilateral hydronephrosis seen on CT   Again this was incidentally noticed.  The wife does not want to pursue any further evaluation.    DVT Prophylaxis: Enoxaparin (Lovenox) SQ  Code Status: DNR / DNI.  I had an extensive discussion with the wife.  She says she been dealing with Mr. Barrera's dementia for 5 years and has realized that he is progressing will need transition to an assisted living/nursing home/primary care, which is currently being arranged.  Given his underlying dementia she does not want aggressive measures taken and she is very reasonable regarding his prognosis.  She does not want aggressive procedures done.  We did discuss potentially having him on do not hospitalize; she is not quite ready for this.    Disposition: Expected discharge in 2+ days once the above issues are resolved..[ES1.1]    Andrea Mcgarry[ES1.2], MD  [phone] (P)  Text Page[ES1.1]     Primary Care Physician   [facility]    Chief  Complaint[ES1.2]   Nausea vomiting, weakness    History is obtained from the patient's wife and medical records[ES1.1]    History of Present Illness[ES1.2]   Chuy Barrera is a 85 year old male who presents with weakness and nausea vomiting.  Mr. Barrera was in his usual state of health at home when he had an episode of vomiting.  He normally lays down in front of the television watches TV and he vomited while he was watching TV.  His wife tried to get him up but he was too weak to sit and stand.  She subsequently called EMS on.  In the emergency department his vitals were stable.  He did have chest x-ray which showed a right lower lobe pneumonia.  He has had a CT scan which was confirmed a right lower lobe pneumonia.  The CT scan also showed cholelithiasis prostate enlargement as well as mild bilateral hydronephrosis.  Mr. Barrera does not have any complaints but given his advanced dementia I cannot get much history from him.[ES1.1]    Past Medical History[ES1.2]    I have reviewed this patient's medical history and updated it with pertinent information if needed.[ES1.1]   Past Medical History:   Diagnosis Date     Late onset Alzheimer's disease with behavioral disturbance 9/20/2017     Personal history of malignant melanoma of skin        Past Surgical History[ES1.2]   I have reviewed this patient's surgical history and updated it with pertinent information if needed.[ES1.1]  Past Surgical History:   Procedure Laterality Date     SURGICAL HISTORY OF -   4/30/99    Wide excision chest lesion     SURGICAL HISTORY OF -   4/20/99    Bx chest lesion, Dx melanoma     SURGICAL HISTORY OF -       s/p Hernia repair     SURGICAL HISTORY OF -   12/5/00    Bx chest lesion scar, neg.       Prior to Admission Medications   None     Allergies   No Known Allergies    Social History[ES1.2]   I have reviewed this patient's social history and updated it with pertinent information if needed. Chuy Barrera[ES1.1]  reports that he has  never smoked. He does not have any smokeless tobacco history on file. He reports that he drinks alcohol. He reports that he does not use illicit drugs.    Family History[ES1.2]   I have reviewed this patient's family history and updated it with pertinent information if needed.[ES1.1]   Family History   Problem Relation Age of Onset     C.A.D. Maternal Grandfather        Review of Systems[ES1.2]   Unable to assess secondary to patient's dementia[ES1.1]    Physical Exam   Temp: 97.5  F (36.4  C) Temp src: Oral BP: 94/49   Heart Rate: 80 Resp: 16 SpO2: 94 % O2 Device: None (Room air)[ES1.2]    Vital Signs with Ranges[ES1.1]  172 lbs 0 oz[ES1.2]    Constitutional: alert, unable to assess orientation given his dementia  Eyes: EOMI, PERRL  HEENT: OP clear  Respiratory: Coarse at his right base  Cardiovascular: RRR without m/r/g  GI: soft, nontender, nondistended, no HSM  Lymph/Hematologic: no cervical LAD  Genitourinary: deferred  Skin: no rashes or lesions grossly  Musculoskeletal: no deformities or arthritis  Neurologic: CN II-XII, FRANCO  Psychiatric: Unable to assess[ES1.1]    Data[ES1.2]   Data reviewed today:  I personally reviewed the chest x-ray image(s) showing Right lower lobe pneumonia and the chest CT image(s) showing Findings as discussed above in the history of present illness..[ES1.1]    Recent Labs  Lab 01/16/18  2320   WBC 17.1*   HGB 15.5   MCV 92         POTASSIUM 3.3*   CHLORIDE 103   CO2 25   BUN 20   CR 1.10   ANIONGAP 9   KAYLI 9.0   *   ALBUMIN 3.8   PROTTOTAL 7.3   BILITOTAL 1.9*   ALKPHOS 113   ALT 84*   AST 99*       Recent Results (from the past 24 hour(s))   XR Chest 2 Views    Narrative    XR CHEST 2 VW  1/17/2018 12:04 AM      HISTORY: Suspect pneumonia.    COMPARISON: None.    FINDINGS: The heart size is normal. The thoracic aorta is calcified  and mildly tortuous. There is a large right lower lobe infiltrate. The  left lung is clear. No pneumothorax or pleural effusion.       Impression    IMPRESSION: Right lower lobe pneumonia.   CT Head w/o Contrast    Narrative    CT SCAN OF THE HEAD WITHOUT CONTRAST  1/17/2018 12:23 AM     HISTORY: AMS, dementia, generalized weakness.     TECHNIQUE: Axial images of the head and coronal reformations without  IV contrast material. Radiation dose for this scan was reduced using  automated exposure control, adjustment of the mA and/or kV according  to patient size, or iterative reconstruction technique.    COMPARISON: None.    FINDINGS: There is generalized atrophy of the brain. There is low  attenuation in the white matter of the cerebral hemispheres consistent  with sequelae of small vessel ischemic disease. There is no evidence  of intracranial hemorrhage, mass, acute infarct or anomaly.     The visualized portions of the sinuses and mastoids appear normal.  There is no evidence of trauma.      Impression    IMPRESSION: No acute abnormality.   CT Abdomen Pelvis w Contrast    Narrative    CT ABDOMEN PELVIS W CONTRAST  1/17/2018 12:25 AM      HISTORY: Abdominal pain, nausea, and vomiting.    TECHNIQUE: CT abdomen and pelvis with intravenous contrast. Radiation  dose for this scan was reduced using automated exposure control,  adjustment of the mA and/or kV according to patient size, or iterative  reconstruction technique. 86 mL Isovue-370.      COMPARISON: None.    FINDINGS:  Abdomen: There is a right lower lobe infiltrate consistent with  pneumonia. Dependent atelectasis at the lung bases. There are coronary  artery atherosclerotic calcifications. The heart size is normal. The  right hemidiaphragm is elevated. The liver and spleen are normal in  appearance. There are multiple stones in the gallbladder. The pancreas  and adrenal glands are normal in appearance. There is mild dilatation  of the renal collecting systems bilaterally. The kidneys otherwise  appear normal. There is no abdominal or pelvic lymph node enlargement.  There is atherosclerotic  calcification of the aorta and its branches.  No aneurysm.    Pelvis: The prostate gland is markedly enlarged. There is mild urinary  bladder wall thickening. The prostate gland enlargement and wall  thickening may be the cause of mild bilateral hydronephrosis. There  are colonic diverticula without acute diverticulitis. No bowel  obstruction or inflammation. Large amount of stool in the rectum. No  free intraperitoneal gas or fluid. There is degenerative disease  throughout the spine.      Impression    IMPRESSION:  1. Right lower lobe pneumonia.  2. Cholelithiasis.  3. Marked prostate gland enlargement.  4. Mild bilateral hydronephrosis which may be due to the enlarged  prostate gland and mild urinary bladder wall thickening.  5. Colonic diverticula without acute diverticulitis.  6. Large amount of stool in the rectum.[ES1.2]          Revision History        User Key Date/Time User Provider Type Action    > ES1.2 1/17/2018  2:03 AM Andrea Mcgarry MD Physician Sign     ES1.1 1/17/2018  1:51 AM Andrea Mcgarry MD Physician                      Discharge Summaries      Discharge Summaries by Radha Beckford MD at 1/18/2018  8:47 AM     Author:  Radha Beckford MD Service:  Hospitalist Author Type:  Physician    Filed:  1/18/2018 11:25 AM Date of Service:  1/18/2018  8:47 AM Creation Time:  1/18/2018  8:47 AM    Status:  Addendum :  Radha Beckford MD (Physician)         [facility]  Discharge Summary  Hospitalist    Date of Admission:  1/16/2018  Date of Discharge:[MW1.1]  1/18/2018[MW1.2]  Discharging Provider:[MW1.1] Radha Beckford[MW1.3], MD[MW1.1]    Discharge Diagnoses[MW1.3]   Aspiration pneumonia of right lower lobe  Dementia, advanced[MW1.4]    History of Present Illness[MW1.3]   Chuy Barrera is an 85 year old male[MW1.1] with advanced dementia[MW1.4] who presented[MW1.1] to the ED after an aspiration event and transient weakness.  Please see admission H&P for complete details.[MW1.4]     Hospital Course[MW1.3]   Chuy Barrera was admitted on 1/16/2018.  The following problems were addressed during his hospitalization:[MW1.1]    Nausea/weakness  Pneumonia of his right lower lobe, organism unknown  Mr. Barrera presented to the hospital today after having an episode of nausea and vomiting. Chest x-ray was done and showed a right lower lobe pneumonia.  CT confirmed this.  He was started on Vanco and Zosyn in the emergency department.  -continue[MW1.4]d on[MW1.5] Zosyn[MW1.4] until he pulled out his IV[MW1.5].    -Blood cultures[MW1.4] NGTD[MW1.5]  -[MW1.4]D[MW1.5]iscussed at length with his wife regarding possible routes be to take with management of Mr. Barrera's pneumonia.  With right lower lobe pneumonia given his dementia,[MW1.4] the concern is for increased risk of recurrent[MW1.5] aspiration. However, in speaking with his wife, she does not want aggressive measures.  She also does not want him to have to limit his diet. The wife does feel that he eats fine and does not have any problems with aspiration when eating.     Dementia[MW1.4], advanced[MW1.5]  This is fairly advanced and history is provided by the wife and the medical record.  He[MW1.4] is moving to [facility][MW1.5] assisted living[MW1.4] at discharge.[MW1.5]     Elevated AST/ALT and total bili  No clear explanation for this.[MW1.4] This was discussed[MW1.5] with the wife and potential pursuit of an ultrasound to see if he has gallstones. She again did not want aggressive measures such as surgery if anything was found on the ultrasound.     Enlarged prostate with bilateral hydronephrosis seen on CT   Again this was incidentally noticed. The wife does not want to pursue any further evaluation.[MW1.4]    Radha Beckford MD  ~~~~~~~~~~~~~~~~~~~~~~~~~~~~~~~~~~~~~~~~~~~~~~~~~~~~~~~~~~~[MW1.1]    Pending Results[MW1.3]   These results will be followed up by Hospitalist.[MW1.1]  Unresulted  Labs Ordered in the Past 30 Days of this Admission     Date and Time Order Name Status Description    1/17/2018 0029 Blood culture Preliminary     1/17/2018 0029 Blood culture Preliminary           Code Status[MW1.3]   DNR / DNI[MW1.1]       Primary Care Physician   Piero Wallace    Physical Exam   Temp: 98.9  F (37.2  C) Temp src: Oral BP: 150/86   Heart Rate: 63 Resp: 18 SpO2: 94 % O2 Device: None (Room air)    Vitals:    01/17/18 0130   Weight: 78 kg (172 lb)[MW1.3]     Vital Signs with Ranges[MW1.1]  Temp:  [98  F (36.7  C)-98.9  F (37.2  C)] 98.9  F (37.2  C)  Heart Rate:  [63-75] 63  Resp:  [18-20] 18  BP: (112-150)/(50-86) 150/86  SpO2:  [93 %-95 %] 94 %         Discharge Disposition[MW1.3]   Discharged to long-term care facility  Condition at discharge: Stable[MW1.1]    Consultations This Hospital Stay   PHARMACY TO [facility]  PHYSICAL THERAPY ADULT IP CONSULT    Time Spent on this Encounter[MW1.3]   I, Radha Beckford, personally saw the patient today and spent 35 minutes discharging this patient.[MW1.1]    Discharge Orders     Home Care PT Referral for Hospital Discharge     Home Care OT Referral for Hospital Discharge     Reason for your hospital stay   Aspiration pneumonia     Follow-up and recommended labs and tests    Follow up with primary care provider, Piero Wallace, as needed.     Activity   Your activity upon discharge: activity as tolerated     MD face to face encounter   Documentation of Face to Face and Certification for Home Health Services    I certify that patient: Chuy Barrera is under my care and that I, or a nurse practitioner or physician's assistant working with me, had a face-to-face encounter that meets the physician face-to-face encounter requirements with this patient on: January 18, 2018.    This encounter with the patient was in whole, or in part, for the following medical condition, which is the primary reason for home health care: severe dementia, aspiration pneumonia.    I  certify that, based on my findings, the following services are medically necessary home health services: Occupational Therapy and Physical Therapy.    My clinical findings support the need for the above services because: Occupational Therapy Services are needed to assess and treat cognitive ability and address ADL safety due to impairment in severe dementia, aspiration pneumonia, physical deconditioning. and Physical Therapy Services are needed to assess and treat the following functional impairments: severe dementia, aspiration pneumonia, physical deconditioning.    Further, I certify that my clinical findings support that this patient is homebound (i.e. absences from home require considerable and taxing effort and are for medical reasons or [employer] services or infrequently or of short duration when for other reasons) because: Mental health symptoms including: Mental health condition is exacerbated by exposure to crowds, unfamiliar environment or new stressful situations. and Patient gets lost or wanders to due to cognitive impairments...    Based on the above findings. I certify that this patient is confined to the home and needs intermittent skilled nursing care, physical therapy and/or speech therapy.  The patient is under my care, and I have initiated the establishment of the plan of care.  This patient will be followed by a physician who will periodically review the plan of care.  Physician/Provider to provide follow up care: Piero Wallace    Attending hospital physician (the Medicare certified [place] provider): Radha Beckford MD  Physician Signature: See electronic signature associated with these discharge orders.  Date: 1/18/2018     DNR/DNI     Diet   Follow this diet upon discharge: Regular Diet Adult       Discharge Medications   Current Discharge Medication List      START taking these medications    Details   QUEtiapine (SEROQUEL) 25 MG tablet Take 1 tablet (25 mg) by mouth nightly as needed For  agitation, insomnia  Qty: 30 tablet, Refills: 3    Associated Diagnoses: Senile dementia, with behavioral disturbance      amoxicillin-clavulanate (AUGMENTIN) 875-125 MG per tablet Take 1 tablet by mouth 2 times daily for 6 days  Qty: 12 tablet, Refills: 0    Associated Diagnoses: Pneumonia of right lower lobe due to infectious organism (H)           Allergies   No Known Allergies  Data[MW1.3]   Most Recent 3 CBC's:[MW1.1]  Recent Labs   Lab Test  01/18/18   0816  01/17/18   0819 01/16/18 2320 09/20/17   1140   WBC  12.4*   --   17.1*  6.9   HGB  13.4   --   15.5  15.6   MCV  93   --   92  95   PLT  190  205  232  239[MW1.3]      Most Recent 3 BMP's:[MW1.1]  Recent Labs   Lab Test  01/17/18   1430  01/17/18   0819  01/16/18 2320 09/20/17   1140  09/16/11   1009   NA   --    --   137  140  140   POTASSIUM  4.2   --   3.3*  4.5  4.6   CHLORIDE   --    --   103  105  102   CO2   --    --   25  26  29   BUN   --    --   20  16  13   CR   --   1.00  1.10  1.04  0.76   ANIONGAP   --    --   9  9  10   KAYLI   --    --   9.0  9.5  9.4   GLC   --    --   109*  91  100*[MW1.3]     Most Recent 2 LFT's:[MW1.1]  Recent Labs   Lab Test  01/16/18 2320 09/20/17   1140   AST  99*  16   ALT  84*  29   ALKPHOS  113  99   BILITOTAL  1.9*  1.6*[MW1.3]     Most Recent INR's and Anticoagulation Dosing History:  Anticoagulation Dose History     There is no flowsheet data to display.        Most Recent 3 Troponin's:[MW1.1]No lab results found.[MW1.3]  Most Recent Cholesterol Panel:[MW1.1]  Recent Labs   Lab Test  09/20/17   1140  09/16/11   1009   CHOL   --   188   LDL  87  114   HDL   --   63   TRIG   --   57[MW1.3]     Most Recent 6 Bacteria Isolates From Any Culture (See EPIC Reports for Culture Details):[MW1.1]  Recent Labs   Lab Test  01/17/18   0035  01/17/18   0020   CULT  No growth after 1 day  No growth after 1 day[MW1.3]     Most Recent TSH, T4 and A1c Labs:[MW1.1]  Recent Labs   Lab Test  09/16/11   1009   TSH  1.36    A1C  5.4[MW1.3]     Results for orders placed or performed during the hospital encounter of 01/16/18   CT Head w/o Contrast    Narrative    CT SCAN OF THE HEAD WITHOUT CONTRAST  1/17/2018 12:23 AM     HISTORY: AMS, dementia, generalized weakness.     TECHNIQUE: Axial images of the head and coronal reformations without  IV contrast material. Radiation dose for this scan was reduced using  automated exposure control, adjustment of the mA and/or kV according  to patient size, or iterative reconstruction technique.    COMPARISON: None.    FINDINGS: There is generalized atrophy of the brain. There is low  attenuation in the white matter of the cerebral hemispheres consistent  with sequelae of small vessel ischemic disease. There is no evidence  of intracranial hemorrhage, mass, acute infarct or anomaly.     The visualized portions of the sinuses and mastoids appear normal.  There is no evidence of trauma.      Impression    IMPRESSION: No acute abnormality.    AUSTIN HANNON MD   XR Chest 2 Views    Narrative    XR CHEST 2 VW  1/17/2018 12:04 AM      HISTORY: Suspect pneumonia.    COMPARISON: None.    FINDINGS: The heart size is normal. The thoracic aorta is calcified  and mildly tortuous. There is a large right lower lobe infiltrate. The  left lung is clear. No pneumothorax or pleural effusion.      Impression    IMPRESSION: Right lower lobe pneumonia.    AUSTIN HANNON MD   CT Abdomen Pelvis w Contrast    Narrative    CT ABDOMEN PELVIS W CONTRAST  1/17/2018 12:25 AM      HISTORY: Abdominal pain, nausea, and vomiting.    TECHNIQUE: CT abdomen and pelvis with intravenous contrast. Radiation  dose for this scan was reduced using automated exposure control,  adjustment of the mA and/or kV according to patient size, or iterative  reconstruction technique. 86 mL Isovue-370.      COMPARISON: None.    FINDINGS:  Abdomen: There is a right lower lobe infiltrate consistent with  pneumonia. Dependent atelectasis at the lung  bases. There are coronary  artery atherosclerotic calcifications. The heart size is normal. The  right hemidiaphragm is elevated. The liver and spleen are normal in  appearance. There are multiple stones in the gallbladder. The pancreas  and adrenal glands are normal in appearance. There is mild dilatation  of the renal collecting systems bilaterally. The kidneys otherwise  appear normal. There is no abdominal or pelvic lymph node enlargement.  There is atherosclerotic calcification of the aorta and its branches.  No aneurysm.    Pelvis: The prostate gland is markedly enlarged. There is mild urinary  bladder wall thickening. The prostate gland enlargement and wall  thickening may be the cause of mild bilateral hydronephrosis. There  are colonic diverticula without acute diverticulitis. No bowel  obstruction or inflammation. Large amount of stool in the rectum. No  free intraperitoneal gas or fluid. There is degenerative disease  throughout the spine.      Impression    IMPRESSION:  1. Right lower lobe pneumonia.  2. Cholelithiasis.  3. Marked prostate gland enlargement.  4. Mild bilateral hydronephrosis which may be due to the enlarged  prostate gland and mild urinary bladder wall thickening.  5. Colonic diverticula without acute diverticulitis.  6. Large amount of stool in the rectum.    AUSTIN HANNON MD[MW1.2]          Revision History        User Key Date/Time User Provider Type Action    > MW1.3 1/18/2018 11:25 AM Radha Beckford MD Physician Addend     MW1.5 1/18/2018 10:16 AM Radha Beckford MD Physician Sign     MW1.4 1/18/2018  9:59 AM Radha Beckford MD Physician      MW1.2 1/18/2018  8:48 AM Radha Beckford MD Physician      MW1.1 1/18/2018  8:47 AM Radha Beckford MD Physician                   Consult Notes     No notes of this type exist for this encounter.         Progress Notes - Physician (Notes from 01/15/18 through 01/18/18)      Progress Notes by  Muna Gardner LICSW at 1/18/2018  8:59 AM     Author:  Muna Gardner LICSW Service:  (none) Author Type:      Filed:  1/18/2018  9:27 AM Date of Service:  1/18/2018  8:59 AM Creation Time:  1/18/2018  8:59 AM    Status:  Addendum :  Muna Gardner LICSW ()         SW:  D: Writer spoke with Berenice,  at [facility].  She has met patient prior to his hospital admit.  In relation to his mobility, she is fine knowing we have wanted patient to have supervision with mobility.  She does request d/c orders to include home PT and OT orders.[KH1.1]  Care Coordinator will complete final arrangements for this discharge.[KH1.2]     Revision History        User Key Date/Time User Provider Type Action    > KH1.2 1/18/2018  9:27 AM Muna Gardner LICSW  Addend     KH1.1 1/18/2018  9:02 AM Muna Gardner LICSW  Sign            Progress Notes by Yazmin Menendez RN at 1/18/2018  8:49 AM     Author:  Yazmin Menendez RN Service:  Care Coordinator Author Type:      Filed:  1/18/2018  9:19 AM Date of Service:  1/18/2018  8:49 AM Creation Time:  1/18/2018  8:49 AM    Status:  Addendum :  Yazmin Menendez, RN ()         Planning potential discharge today.  Care Coordinator has conversed with Berenice, Nurse from [facility].  She is aware that we have had a sitter with pt through the night, that was mainly for his roommate, but pt is impulsive, so requires close monitoring.  They are able to accept pt today and prefer that he arrives by 1:00pm.  His spouse Allison is here.  She originally planned to transport, but now would like transportation set-up.  Nsg will page Hospitalist to see if they are able to see pt early.  Will set-up transport in the mean time.[HN1.1]    Addendum[HN1.2] 0900[HN1.3]:  [facility] wheelchair transport has been set-up for 11:45am today[HN1.2].    Addendum 0920:  Received call from Berenice from [facility]  custodial, they prefer his medications are filled here and sent with pt.[HN1.3]     Revision History        User Key Date/Time User Provider Type Action    > HN1.3 1/18/2018  9:19 AM Yazmin Menendez RN Case Manager Addend     HN1.2 1/18/2018  9:14 AM Yazmin Menendez RN Case Manager Addend     HN1.1 1/18/2018  8:53 AM Yazmin Menendez RN Case Manager Sign            Progress Notes by Muna Gardner LICSW at 1/17/2018  4:27 PM     Author:  Muna Gardner LICSW Service:  (none) Author Type:      Filed:  1/17/2018  4:34 PM Date of Service:  1/17/2018  4:27 PM Creation Time:  1/17/2018  4:27 PM    Status:  Signed :  Muna Gardner LICSW ()         SW  D:  Wife approached writer to explain she has made arrangements for her  to be admitted to the [facility] located on the [facility]. The [facility] does have a memory care assisted living unit.  Wife and [facility] are just waiting for the funding approval thru [employer].    Writer also received a call from Berenice, nurse  for the unit patient will be admitted to at the [place]. ([phone]).  In her message, Berenice, stated she is hoping patient can be admitted to [facility] directly from the hospital.  Per care coordinator, patient may be ready for d/c on Thursday. [facility] requires their admits to arrive generally by 1200 to 1300.  Writer called Berenice and left a message that patient may be ready for d/c as soon as Thursday and asked if they are prepared to accept him this soon. Writer also faxed her clinical data for her review.      On day of discharge wife plans to transport patient.      P:  On Thursday, writer or care coordinator will speak with Berenice to complete assessment of patient's status in the hospital and determine when they can admit patient.[KH1.1]     Revision History        User Key Date/Time User Provider Type Action    > KH1.1 1/17/2018  4:34 PM Muna Gardner LICSW   Sign            Progress Notes by Radha Beckford MD at 1/17/2018  1:54 PM     Author:  Radha Beckford MD Service:  Hospitalist Author Type:  Physician    Filed:  1/17/2018  1:58 PM Date of Service:  1/17/2018  1:54 PM Creation Time:  1/17/2018  1:54 PM    Status:  Signed :  Radha Beckford MD (Physician)         Hospitalist  Interval Note[MW1.1]  1/17/2018[MW1.2]    Patient admitted early morning for pneumonia. Stable. Has not needed oxygen.     Severe dementia at baseline and at high risk for delirium here. Persistently trying to get up and leave. Denies any symptoms (which wife states is his baseline). He already pulled out his IV.    Updated plan:  D/c IV and zosyn  Start augmentin BID  Schedule seroquel BID and reassess for prn dosing.[MW1.1]    Radha Beckford[MW1.3], MD[MW1.1]         Revision History        User Key Date/Time User Provider Type Action    > MW1.3 1/17/2018  1:58 PM Radha Beckford MD Physician Sign     MW1.2 1/17/2018  1:55 PM Radha Beckford MD Physician      MW1.1 1/17/2018  1:54 PM Radha Beckford MD Physician             ED Provider Notes by Phani Love MD at 1/16/2018 11:03 PM     Author:  Phani Love MD Service:  Emergency Medicine Author Type:  Physician    Filed:  1/17/2018  3:25 AM Date of Service:  1/16/2018 11:03 PM Creation Time:  1/16/2018 11:30 PM    Status:  Signed :  Phani Love MD (Physician)           History     Chief Complaint:  Nausea & Vomiting     The history is provided by the spouse and the EMS personnel.      Chuy Barrera is a 85-year-old male with a history of Alzheimer's disease who presents via EMS for evaluation of nausea and vomiting, increased bowel movement, and increased frequency of urination.  The patient's wife states that he was lying in bed face up watching television and when she  "returned to their bedroom the patient had vomited on himself.  She states that he was not able to get up after this on his own which is why she called EMS.  Wife states that the patient is normally able to walk unassisted.  No fever has been noted.  There has been no recent fall or trauma.  Wife states that he coughs at baseline but this has not increased.    Allergies:  No known drug allergies.      Medications:    The patient is currently on no regular medications.     Past Medical History:    Actinic Keratosis   Alzheimer's disease     Past Surgical History:    Wide excision of chest lesion, melanoma   Hernia repair     Family History:    History reviewed. No pertinent family history.     Social History:  Marital Status:   Presents to the ED via EMS  Tobacco Use: Never  Alcohol Use: Occasional   PCP:[CK1.1] [facility][CK1.2]   The patient lives in his own home with his wife.      Review of Systems   Unable to perform ROS: Dementia     Physical Exam[CK1.1]   Patient Vitals for the past 24 hrs:   BP Temp Temp src Heart Rate Resp SpO2 Height Weight   01/17/18 0145 129/84 - - 83 24 92 % - -   01/17/18 0130 94/49 - - 80 16 94 % - 78 kg (172 lb)   01/17/18 0115 93/52 - - - 10 95 % - -   01/17/18 0100 105/60 - - 87 16 95 % - -   01/17/18 0045 115/59 - - 90 18 98 % - -   01/17/18 0030 122/74 - - - - 95 % - -   01/16/18 2350 96/65 - - 88 - 96 % - -   01/16/18 2329 107/59 - - 101 - 95 % - -   01/16/18 2324 (!) 85/55 - - 100 - 93 % - -   01/16/18 2304 98/64 97.5  F (36.4  C) Oral 107 - 93 % 1.905 m (6' 3\") -[CK1.3]      Physical Exam[CK1.1]  Vitals: reviewed by me  General: Pt seen on [facility], pleasant, cooperative, and alert to conversation but does ask inappropriate questions and is demented at baseline.  Eyes: Tracking well, clear conjunctiva BL  ENT: MMM, midline trachea.   Lungs:   No tachypnea noted, scant accessory muscle use with subcostal retractions, though this is minimal.  Does " have positive breath sounds to both sides, coarse airways in bilateral bases  CV: Rate as above, regular rhythm.    Abd: Soft, but does have some potential tenderness to the epigastrium on palpation, intermittent guarding, no rebound.  No tenderness to palpation to lower abdomen.  We will note the patient is non evaluable given his demented state.  MSK: no peripheral edema or joint effusion.  No evidence of trauma  Skin: No rash, normal turgor and temperature  Neuro: Clear speech and no facial droop.  Moving all extremities spontaneously cranial nerves II through XII are intact.  Psych: Not RIS, no e/o AH/VH[MF1.1]      Emergency Department Course   ECG:  @ 2313  Indication:[CK1.1] Altered mental status[CK1.4]   Vent. Rate 106 bpm. NJ interval * ms. QRS duration 84 ms. QT/QTc 488/648 ms. P-R-T axis * 49 58.   Accelerated Junctional rhythm with occasional PVCs. Nonspecific T wave abnormality. Prolonged QT. Abnormal ECG.  Read @ 2318 by Dr. Love.     Imaging:[CK1.1]  CT Head w/o Contrast  No acute abnormality.     CT Abdomen Pelvis a Contrast  1. Right lower lobe pneumonia.  2. Cholelithiasis.  3. Marked prostate gland enlargement.  4. Mild bilateral hydronephrosis which may be due to the enlarged  prostate gland and mild urinary bladder wall thickening.  5. Colonic diverticula without acute diverticulitis.  6. Large amount of stool in the rectum.    XR Chest 2 Views  Right lower lobe pneumonia.    Radiographic findings were communicated with the patient, family, and Admitting MD who voiced understanding of the findings.[CK1.4]    Laboratory:[CK1.1]  Blood:  Initial blood draw collected at 2320  CBC:  WBC 17.1, HGB 15.5, , otherwise WNL   CMP: K 3.3, Bilirubin 1.9, ALT 84, AST 99, otherwise WNL (Creatinine 1.10)  Lactate: 3.3   Blood Culture: Pending ×2.      Repeat blood draw collected at 0100  Lactate: 3.1    Stool:  Occult Blood: Negative.     Urine:  UA: Clear yellow urine, trace blood, RBC 8,  moderate leukocyte esterase, WBC 13, mucous present, otherwise WNL[CK1.4]     Interventions:[CK1.1]  (2320) Normal Saline, 1 liter, IV bolus   (0038) Normal Saline, 1 liter, IV bolus   (0041) Zosyn, 3.375 g, IV infusion   (0106) Vancomycin, 1750 mg, IV   (0135) Lactated Ringer's Bolus, 500 mL, IV[CK1.4]     Emergency Department Course:  Nursing notes and vitals reviewed.    (2334) I entered the room with my scribe, obtained the history, and performed an exam of the patient as documented above.    EKG was done, interpretation as above.     A peripheral IV was established. Blood was drawn from the patient. This was sent for laboratory testing, findings above.      Urine sample was obtained and sent for laboratory analysis, findings above.     The patient was sent for a[CK1.1] chest x-ray as well as CT scans of the Head, abdomen, and pelvis[CK1.4] while in the emergency department, findings above.      ([CK1.1]0100[CK1.4]) I went to[CK1.1] reevaluate the patient. Patient appears to be doing better. Discussed plan of care with the patient and his wife. They are amenable to plan.     Findings and plan explained to the patient and his wife who consents to admission.     (0103) I discussed the patient with Dr. Mcgarry of the hospitalist service, who will admit the patient to an inpatient bed for further monitoring, evaluation, and treatment.[CK1.4]       Impression & Plan    CMS Diagnoses:[CK1.1]   The patient has signs of Severe Sepsis as evidenced by:    1. 2 SIRS criteria, AND  2. Suspected infection, AND   3. Organ dysfunction: Lactic Acid >2    Time severe sepsis diagnosis confirmed = 2337 as this was the time when Lactate resulted, and the level was >2      3 Hour Severe Sepsis Bundle Completion:  1. Initial Lactic Acid Result:[CK1.4]   Recent Labs   Lab Test  01/17/18 0100 01/16/18   2320   LACT  3.1*  3.3*[CK1.5]     2. Blood Cultures before Antibiotics: Yes  3. Broad Spectrum Antibiotics Administered:  Yes[CK1.4]     Anti-infectives (Future)    Start     Dose/Rate Route Frequency Ordered Stop    01/17/18 0038  vancomycin (VANCOCIN) 1,750 mg in NaCl 0.9 % 500 mL intermittent infusion      1,750 mg  over 2 Hours Intravenous ONCE 01/17/18 0037[CK1.5]          4. 2500 ml of IV fluids.    the patient was transferred out of the ED prior to the 6 hour jorge alberto.[CK1.4]       Medical Decision Making:[CK1.1]Chuy Barrera[CK1.6] is an 85-year-old male who presents to the emergency room with nausea, vomiting, found to have right lower lobe pneumonia.  I believe this does explain the majority of the patient's symptoms as he has generalized deconditioning and vomiting ×1 with an otherwise benign abdomen.  The patient is an evaluable, and for this reason CT scan of the abdomen was obtained when lactate was elevated.  The patient is receiving sepsis bundle for[CK1.4] likely PNA as source[MF1.1], and elevated lactate.  On repeat[CK1.4] perfusion[MF1.1] exam at 0100, the patient is improving, he is pending a repeat lactate at this point.  I have spoken to Dr. Mcgarry who has generously agreed to accept the care of the patient and continue treatment for his pneumonia.  The patient again has a negative CT scan of the head, electrolytes are otherwise within normal limits, and he has no evidence of strokelike syndrome at this point.[CK1.4]  CT abdomen pelvis shows gallstones with no evidence of cholecystitis, per report and discussion with hospitalist, ultrasound can safely be done upstairs is main issue does appear to be pneumonia.[MF1.1]  I discussed this with the patient's wife at bedside who is in agreement with the plan of care and will monitor very carefully until bed available.[CK1.4]    Diagnosis:[CK1.1]    ICD-10-CM   1. Pneumonia of right lower lobe due to infectious organism (H) J18.1           2. Sepsis, due to unspecified organism (H) A41.9[CK1.5]     Disposition:[CK1.1]  Admission[CK1.4]       [facility]  EMERGENCY DEPARTMENT        Scribe disclosure:  Eric ADAMS, am serving as a scribe on 1/16/2018 at 11:34 PM to personally document services performed by Dr. Love based on my observations and the provider's statements to me.[CK1.1]                   Phani Love MD  01/17/18 0325  [MF1.2]     Revision History        User Key Date/Time User Provider Type Action    > MF1.2 1/17/2018  3:25 AM Phani Love MD Physician Sign     MF1.1 1/17/2018  3:22 AM Phani Love MD Physician      CK1.5 1/17/2018  2:00 AM Koherika, Eric Scribe Share     CK1.3 1/17/2018  1:55 AM Kohout, Eric Scribe      CK1.6 1/17/2018 12:59 AM Kohout, Eric Scribe      CK1.4 1/16/2018 11:42 PM Kohout, Eric Scribe Share     CK1.2 1/16/2018 11:32 PM Kohout, Eric Scribe      CK1.1 1/16/2018 11:30 PM Kohout, Eric Scribe             ED Notes signed by Ricarda Renee-Provider at 1/17/2018  2:12 AM      Author:  Thelma Non-Provider Service:  (none) Author Type:  (none)    Filed:  1/17/2018  2:12 AM Date of Service:  1/17/2018  1:09 AM Creation Time:  1/17/2018  2:12 AM    Status:  Signed :  Thelma Non-Provider     Scan on 1/17/2018  2:12 AM by Thelma Non-Provider : [facility] 1          Revision History        User Key Date/Time User Provider Type Action    > [N/A] 1/17/2018  2:12 AM Scan, Non-Provider (none) Sign            ED Notes by Félix Kim RN at 1/17/2018  1:23 AM     Author:  Félix Kim, RN Service:  (none) Author Type:  Registered Nurse    Filed:  1/17/2018  1:54 AM Date of Service:  1/17/2018  1:23 AM Creation Time:  1/17/2018  1:29 AM    Status:  Addendum :  Félix Kim, RN (Registered Nurse)         [facility]  ED Nurse Handoff Report    ED Chief complaint: Nausea & Vomiting (Sudden onset of N/V tonight, warm to touch, increased frequency of urination and bowel movement, hypotensive per EMS reports.  "History of dementia, baseline mentation no change. )      ED Diagnosis:   Final diagnoses:   Pneumonia of right lower lobe due to infectious organism (H)   Sepsis, due to unspecified organism (H)       Code Status: Special code    Allergies: No Known Allergies    Activity level - Baseline/Home:  Stand with Assist    Activity Level - Current:   Stand with Assist of 2     Needed?: No    Isolation: No  Infection: Not Applicable    Bariatric?: No    Vital Signs:   Vitals:    01/16/18 2304 01/16/18 2324 01/16/18 2329 01/16/18 2350   BP: 98/64 (!) 85/55 107/59 96/65   Temp: 97.5  F (36.4  C)      TempSrc: Oral      SpO2: 93% 93% 95% 96%   Height: 1.905 m (6' 3\")          Cardiac Rhythm: ,        Pain level:      Is this patient confused?: Yes, baseline confusion from dementia.     Patient Report: Initial Complaint: nausea vomiting and weakness  Focused Assessment: Patient with history of Dementia, lives at home with wife, transitioning to a memory care unit per wife's reports. Patient has sudden onset of nausea vomiting and weakness today prompted to activate EMS. Wife reports patient was warm to touch but did not take temperature at home. Reports increased frequency of urination last few days.   Tests Performed: labs, CT, CXR  Abnormal Results: wbc, lactic acid, CXR  Treatments provided: Zosyn, Vanco, NS b[XQ1.1]olus x 2 L,  ml bolus.[XQ1.2]     Family Comments: wife at bedside    OBS brochure/video discussed/provided to patient: N/A    ED Medications:   Medications   vancomycin (VANCOCIN) 1,750 mg in NaCl 0.9 % 500 mL intermittent infusion (1,750 mg Intravenous New Bag 1/17/18 0106)   0.9% sodium chloride BOLUS (0 mLs Intravenous Stopped 1/16/18 2352)   iopamidol (ISOVUE-370) solution 86 mL (86 mLs Intravenous Given 1/17/18 0024)   Saline flush (66 mLs Intravenous Given 1/17/18 0024)   0.9% sodium chloride BOLUS (0 mLs Intravenous Stopped 1/17/18 0101)   piperacillin-tazobactam (ZOSYN) infusion 3.375 g " (0 g Intravenous Stopped 1/17/18 0105)       Drips infusing?:  No      ED NURSE PHONE NUMBER: [phone][XQ1.1]            Revision History        User Key Date/Time User Provider Type Action    > XQ1.2 1/17/2018  1:54 AM Félix Kim RN Registered Nurse Addend     XQ1.1 1/17/2018  1:29 AM Félix Kim RN Registered Nurse Sign            ED Notes by Danuta Fernandez RN at 1/16/2018 11:05 PM     Author:  Danuta Fernandez RN Service:  (none) Author Type:  Registered Nurse    Filed:  1/16/2018 11:05 PM Date of Service:  1/16/2018 11:05 PM Creation Time:  1/16/2018 11:05 PM    Status:  Signed :  Danuta Fernandez RN (Registered Nurse)         Bed: ED25  Expected date:   Expected time:   Means of arrival:   Comments:  418-85M N/V     Revision History        User Key Date/Time User Provider Type Action    > AH1.1 1/16/2018 11:05 PM Danuta Fernandez, RN Registered Nurse Sign                  Procedure Notes     No notes of this type exist for this encounter.      Progress Notes - Therapies (Notes from 01/15/18 through 01/18/18)     No notes of this type exist for this encounter.

## 2018-01-16 NOTE — IP AVS SNAPSHOT
` ` Patient Information     Patient Name Sex     Chuy Barrera ([id number]) Male 1932       Room Bed    [facility] 6618-02      Patient Demographics     Address Phone    [address]  [place] [address] [phone] (Home)  [phone] (Mobile)      Patient Ethnicity & Race     Ethnic Group Patient Race    American White      Emergency Contact(s)     Name Relation Home Work Mobile    Renan Barrera Spouse [phone]        Documents on File        Status Date Received Description       Documents for the Patient    Insurance Card  ()      Face Sheet Received () 07     Privacy Notice - [place] Received 11     Face Sheet Received () 09     External Medication Information Consent Accepted () 09     Patient ID Scan Refused 17     Consent for Services - Hospital/Clinic Received () 11     Insurance Card Received () 11     External Medication Information Consent Accepted () 11     Consent to Communicate   Auth to discuss protected health information    Advance Directives and Living Will Not Received  Health Care Directive 2012    Consent for EHR Access  13 Copied from existing Consent for services - C/HOD collected on 2011    [facility] Specified Other       Consent for Services/Privacy Notice - Hospital/Clinic Received 17     Insurance Card Received 17 medicare    External Medication Information Consent Accepted 17     Power of  Not Received  POWER OF -NOT DATED    Consent to Communicate Received 17 wife renan    Consent to Communicate  17 AUTHORIZATION TO DISCUSS PROTECTED HEALTH INFORMATION 17    Advance Directives and Living Will Received 10/04/17 Health Care Directive 17    Advance Directives and Living Will Not Received  Validation of AD 17    Care Everywhere Prospective Auth Received 18        Documents for the Encounter     CMS IM for Patient Signature Received 01/17/18     EMS/Ambulance Record  01/17/18 [facility]      Admission Information     Attending Provider Admitting Provider Admission Type Admission Date/Time    Andrea Mcgarry MD Schneider, Eric Stephen, MD Emergency 01/16/18  2305    Discharge Date Hospital Service Auth/Cert Status Service Area     Hospitalist Incomplete [facility]    Unit Room/Bed Admission Status       [address] UNIT 6618/6618-02 Admission (Confirmed)       Admission     Complaint    Pneumonia      Hospital Account     Name Acct ID Class Status Primary Coverage    Chuy Barrera [id number] Inpatient Open MEDICARE - MEDICARE            Guarantor Account (for Hospital Account #[id number])     Name Relation to Pt Service Area Active? Acct Type    Chuy Barrera  FCS Yes Personal/Family    Address Phone          [address]  [place] [address] [phone](H)              Coverage Information (for Hospital Account #[id number])     F/O Payor/Plan Precert #    MEDICARE/MEDICARE     Subscriber Subscriber #    Chuy Barrera [id number]    Address Phone    ATTN CLAIMS  [address]  [facility] [place] [address] [phone]

## 2018-01-16 NOTE — IP AVS SNAPSHOT
"    [place] [address] [facility]: [phone]                                              INTERAGENCY TRANSFER FORM - LAB / IMAGING / EKG / EMG RESULTS   2018                    Hospital Admission Date: 2018  JORDEN GARZA   : 1932  Sex: Male        Attending Provider: Andrea Mcgarry MD     Allergies:  No Known Allergies    Infection:  None   Service:  HOSPITALIST    Ht:  1.905 m (6' 3\")   Wt:  78 kg (172 lb)   Admission Wt:  78 kg (172 lb)    BMI:  21.5 kg/m 2   BSA:  2.03 m 2            Patient PCP Information     Provider PCP Type    Piero Wallace MD General         Lab Results - 3 Days      Blood culture [[id number]]  Resulted: 18 1041, Result status: Preliminary result    Ordering provider: Phani Love MD  18 0029 Resulting lab: [facility]    Specimen Information    Type Source Collected On   Blood Arm, Left 18 0020   Comment:  Right Arm          Components       Value Reference Range Flag Lab   Specimen Description Blood Right Arm      Special Requests Aerobic and anaerobic bottles received   FrStHsLb   Culture Micro No growth after 1 day   225            Blood culture [[id number]]  Resulted: 18 1041, Result status: Preliminary result    Ordering provider: Phani Love MD  18 0029 Resulting lab: [facility]    Specimen Information    Type Source Collected On   Blood Arm, Right 18 0035   Comment:  Left Arm          Components       Value Reference Range Flag Lab   Specimen Description Blood Left Arm      Special Requests Aerobic and anaerobic bottles received   FrStHsLb   Culture Micro No growth after 1 day   225            CBC with platelets [[id number]] (Abnormal)  Resulted: 18 0827, Result status: Final result    Ordering provider: Andrea Mcgarry MD  18 0000 Resulting lab: [facility] "    Specimen Information    Type Source Collected On   Blood  01/18/18 0816          Components       Value Reference Range Flag Lab   WBC 12.4 4.0 - 11.0 10e9/L H FrStHsLb   RBC Count 4.18 4.4 - 5.9 10e12/L L FrStHsLb   Hemoglobin 13.4 13.3 - 17.7 g/dL  FrStHsLb   Hematocrit 38.7 40.0 - 53.0 % L FrStHsLb   MCV 93 78 - 100 fl  FrStHsLb   MCH 32.1 26.5 - 33.0 pg  FrStHsLb   MCHC 34.6 31.5 - 36.5 g/dL  FrStHsLb   RDW 13.8 10.0 - 15.0 %  FrStHsLb   Platelet Count 190 150 - 450 10e9/L  FrStHsLb            Potassium [[id number]]  Resulted: 01/17/18 1523, Result status: Final result    Ordering provider: Radha Beckford MD  01/17/18 1258 Resulting lab: [facility]    Specimen Information    Type Source Collected On   Blood  01/17/18 1430          Components       Value Reference Range Flag Lab   Potassium 4.2 3.4 - 5.3 mmol/L  FrStHsLb            Creatinine [[id number]]  Resulted: 01/17/18 0847, Result status: Final result    Ordering provider: Andrea Mcgarry MD  01/17/18 0256 Resulting lab: [facility]    Specimen Information    Type Source Collected On   Blood  01/17/18 0819          Components       Value Reference Range Flag Lab   Creatinine 1.00 0.66 - 1.25 mg/dL  FrStHsLb   GFR Estimate 71 >60 mL/min/1.7m2  FrStHsLb   Comment:  Non  GFR Calc   GFR Estimate If Black 86 >60 mL/min/1.7m2  FrStHsLb   Comment:   GFR Calc            Platelet count [[id number]]  Resulted: 01/17/18 0838, Result status: Final result    Ordering provider: Andrea Mcgarry MD  01/17/18 0256 Resulting lab: [facility]    Specimen Information    Type Source Collected On   Blood  01/17/18 0819          Components       Value Reference Range Flag Lab   Platelet Count 205 150 - 450 10e9/L  FrStHsLb            Lactic acid [[id number]] (Abnormal)  Resulted: 01/17/18 0145, Result status: Final result    Ordering provider: Phani Love  MD [place]  01/17/18 0117 Resulting lab: [facility]    Specimen Information    Type Source Collected On   Blood  01/17/18 0100          Components       Value Reference Range Flag Lab   Lactic Acid 3.1 0.4 - 2.0 mmol/L H FrStHsLb            UA with Microscopic [[id number]] (Abnormal)  Resulted: 01/17/18 0104, Result status: Final result    Ordering provider: Phani Love MD  01/16/18 2310 Resulting lab: [facility]    Specimen Information    Type Source Collected On   Catheterized Urine Urine catheter 01/17/18 0052          Components       Value Reference Range Flag Lab   Color Urine Yellow   FrStHsLb   Appearance Urine Clear   FrStHsLb   Glucose Urine Negative NEG^Negative mg/dL  FrStHsLb   Bilirubin Urine Negative NEG^Negative  FrStHsLb   Ketones Urine Negative NEG^Negative mg/dL  FrStHsLb   Specific [place] Urine 1.015 1.003 - 1.035  FrStHsLb   Blood Urine Trace NEG^Negative A FrStHsLb   pH Urine 5.0 5.0 - 7.0 pH  FrStHsLb   Protein Albumin Urine Negative NEG^Negative mg/dL  FrStHsLb   Urobilinogen mg/dL Normal 0.0 - 2.0 mg/dL  FrStHsLb   Nitrite Urine Negative NEG^Negative  FrStHsLb   Leukocyte Esterase Urine Moderate NEG^Negative A FrStHsLb   Source Catheterized Urine   FrStHsLb   WBC Urine 13 0 - 2 /HPF H FrStHsLb   RBC Urine 8 0 - 2 /HPF H FrStHsLb   Mucous Urine Present NEG^Negative /LPF A FrStHsLb            Comprehensive metabolic panel [[id number]] (Abnormal)  Resulted: 01/17/18 0005, Result status: Final result    Ordering provider: Phani Love MD  01/16/18 6522 Resulting lab: [facility]    Specimen Information    Type Source Collected On   Blood  01/16/18 2320          Components       Value Reference Range Flag Lab   Sodium 137 133 - 144 mmol/L  FrStHsLb   Potassium 3.3 3.4 - 5.3 mmol/L L FrStHsLb   Chloride 103 94 - 109 mmol/L  FrStHsLb   Carbon Dioxide 25 20 - 32 mmol/L  FrStHsLb   Anion Gap 9 3 - 14  mmol/L  FrStHsLb   Glucose 109 70 - 99 mg/dL H FrStHsLb   Urea Nitrogen 20 7 - 30 mg/dL  FrStHsLb   Creatinine 1.10 0.66 - 1.25 mg/dL  FrStHsLb   GFR Estimate 64 >60 mL/min/1.7m2  FrStHsLb   Comment:  Non  GFR Calc   GFR Estimate If Black 77 >60 mL/min/1.7m2  FrStHsLb   Comment:  African American GFR Calc   Calcium 9.0 8.5 - 10.1 mg/dL  FrStHsLb   Bilirubin Total 1.9 0.2 - 1.3 mg/dL H FrStHsLb   Albumin 3.8 3.4 - 5.0 g/dL  FrStHsLb   Protein Total 7.3 6.8 - 8.8 g/dL  FrStHsLb   Alkaline Phosphatase 113 40 - 150 U/L  FrStHsLb   ALT 84 0 - 70 U/L H FrStHsLb   AST 99 0 - 45 U/L H FrStHsLb            CBC with platelets differential [[id number]] (Abnormal)  Resulted: 01/17/18 0000, Result status: Final result    Ordering provider: Phani Love MD  01/16/18 3153 Resulting lab: [facility]    Specimen Information    Type Source Collected On   Blood  01/16/18 3895          Components       Value Reference Range Flag Lab   WBC 17.1 4.0 - 11.0 10e9/L H FrStHsLb   RBC Count 4.82 4.4 - 5.9 10e12/L  FrStHsLb   Hemoglobin 15.5 13.3 - 17.7 g/dL  FrStHsLb   Hematocrit 44.1 40.0 - 53.0 %  FrStHsLb   MCV 92 78 - 100 fl  FrStHsLb   MCH 32.2 26.5 - 33.0 pg  FrStHsLb   MCHC 35.1 31.5 - 36.5 g/dL  FrStHsLb   RDW 13.1 10.0 - 15.0 %  FrStHsLb   Platelet Count 232 150 - 450 10e9/L  FrStHsLb   Diff Method Manual Differential   FrStHsLb   % Neutrophils 90.0 %  FrStHsLb   % Lymphocytes 1.0 %  FrStHsLb   % Monocytes 9.0 %  FrStHsLb   % Eosinophils 0.0 %  FrStHsLb   % Basophils 0.0 %  FrStHsLb   Absolute Neutrophil 15.4 1.6 - 8.3 10e9/L H FrStHsLb   Absolute Lymphocytes 0.2 0.8 - 5.3 10e9/L L FrStHsLb   Absolute Monocytes 1.5 0.0 - 1.3 10e9/L H FrStHsLb   Absolute Eosinophils 0.0 0.0 - 0.7 10e9/L  FrStHsLb   Absolute Basophils 0.0 0.0 - 0.2 10e9/L  FrStHsLb   RBC Morphology Normal   FrStHsLb   Platelet Estimate Automated count confirmed.  Platelet morphology is normal.   FrStHsLb             Occult blood stool (ED Lab POCT Only 3-11) [[id number]]  Resulted: 01/16/18 2345, Result status: Final result    Ordering provider: Phani Love MD  01/16/18 2339 Resulting lab: [facility]    Specimen Information    Type Source Collected On   Stool Stool 01/16/18 2330          Components       Value Reference Range Flag Lab   Occult Blood Negative NEG^Negative  FrStHsLb            Lactic acid whole blood [[id number]] (Abnormal)  Resulted: 01/16/18 2337, Result status: Final result    Ordering provider: Phani Love MD  01/16/18 2315 Resulting lab: [facility]    Specimen Information    Type Source Collected On   Blood  01/16/18 2320          Components       Value Reference Range Flag Lab   Lactic Acid 3.3 0.7 - 2.0 mmol/L H FrStHsLb            Testing Performed By     Lab - Abbreviation Name Director Address Valid Date Range    14 - FrStHsLb [facility] Unknown [address] [facility] [address] 05/08/15 1057 - Present    225 - Unknown [facility] Unknown [address] [facility] [address] 12/19/14 0954 - Present            Unresulted Labs (24h ago through future)    Start       Ordered    01/19/18 0600  Platelet count  (Pharmacological Prophylaxis - enoxaparin (LOVENOX) *Use only if creatinine clearance is greater than 30 mL/min)  EVERY THREE DAYS,   Routine     Comments:  Repeat every 3 days while on VTE prophylaxis.  Notify provider and hold enoxaparin if platelet count falls by 50% of baseline. If no result is listed, this lab has not been done the past 365 days. LATEST LAB RESULT: Platelet Count (10e9/L)       Date                     Value                 01/16/2018               232              ----------    01/18/18 0849    01/19/18 0600  Creatinine  (Pharmacological Prophylaxis - enoxaparin (LOVENOX) *Use only if creatinine clearance is greater than 30 mL/min)  EVERY THREE  "DAYS,   Routine     Comments:  Repeat every 3 days while on VTE prophylaxis.    01/18/18 0849    Unscheduled  Potassium  (Potassium Replacement - \"Standard\" - For K levels less than 3.4 mmol/L - UU,UR,UA,RH,SH,PH,WY )  CONDITIONAL (SPECIFY),   Routine     Comments:  Obtain Potassium Level for these conditions:  *IF no potassium result within 24 hours before initiation of order set, draw potassium level with next lab collect.    *2 HOURS AFTER last IV potassium replacement dose and 4 hours after an oral replacement dose.  *Next morning after potassium dose.     Repeat Potassium Replacement if necessary.    01/17/18 0256         Imaging Results - 3 Days      XR Chest 2 Views [[id number]]  Resulted: 01/17/18 0547, Result status: Final result    Ordering provider: Phani Love MD  01/16/18 2340 Resulted by: Austin Montanez MD    Performed: 01/16/18 2352 - 01/17/18 0004 Resulting lab: RADIOLOGY RESULTS    Narrative:       XR CHEST 2 VW  1/17/2018 12:04 AM      HISTORY: Suspect pneumonia.    COMPARISON: None.    FINDINGS: The heart size is normal. The thoracic aorta is calcified  and mildly tortuous. There is a large right lower lobe infiltrate. The  left lung is clear. No pneumothorax or pleural effusion.      Impression:       IMPRESSION: Right lower lobe pneumonia.    AUSTIN MONTANEZ MD      CT Head w/o Contrast [[id number]]  Resulted: 01/17/18 0546, Result status: Final result    Ordering provider: Phani Love MD  01/16/18 2340 Resulted by: Austin Montanez MD    Performed: 01/17/18 0023 - 01/17/18 0023 Resulting lab: RADIOLOGY RESULTS    Narrative:       CT SCAN OF THE HEAD WITHOUT CONTRAST  1/17/2018 12:23 AM     HISTORY: AMS, dementia, generalized weakness.     TECHNIQUE: Axial images of the head and coronal reformations without  IV contrast material. Radiation dose for this scan was reduced using  automated exposure control, adjustment of the mA and/or kV " according  to patient size, or iterative reconstruction technique.    COMPARISON: None.    FINDINGS: There is generalized atrophy of the brain. There is low  attenuation in the white matter of the cerebral hemispheres consistent  with sequelae of small vessel ischemic disease. There is no evidence  of intracranial hemorrhage, mass, acute infarct or anomaly.     The visualized portions of the sinuses and mastoids appear normal.  There is no evidence of trauma.      Impression:       IMPRESSION: No acute abnormality.    AUSTIN MONTANEZ MD      CT Abdomen Pelvis w Contrast [[id number]]  Resulted: 01/17/18 0544, Result status: Final result    Ordering provider: Phani Love MD  01/16/18 2352 Resulted by: Austin Montanez MD    Performed: 01/17/18 0023 - 01/17/18 0025 Resulting lab: RADIOLOGY RESULTS    Narrative:       CT ABDOMEN PELVIS W CONTRAST  1/17/2018 12:25 AM      HISTORY: Abdominal pain, nausea, and vomiting.    TECHNIQUE: CT abdomen and pelvis with intravenous contrast. Radiation  dose for this scan was reduced using automated exposure control,  adjustment of the mA and/or kV according to patient size, or iterative  reconstruction technique. 86 mL Isovue-370.      COMPARISON: None.    FINDINGS:  Abdomen: There is a right lower lobe infiltrate consistent with  pneumonia. Dependent atelectasis at the lung bases. There are coronary  artery atherosclerotic calcifications. The heart size is normal. The  right hemidiaphragm is elevated. The liver and spleen are normal in  appearance. There are multiple stones in the gallbladder. The pancreas  and adrenal glands are normal in appearance. There is mild dilatation  of the renal collecting systems bilaterally. The kidneys otherwise  appear normal. There is no abdominal or pelvic lymph node enlargement.  There is atherosclerotic calcification of the aorta and its branches.  No aneurysm.    Pelvis: The prostate gland is markedly enlarged. There  is mild urinary  bladder wall thickening. The prostate gland enlargement and wall  thickening may be the cause of mild bilateral hydronephrosis. There  are colonic diverticula without acute diverticulitis. No bowel  obstruction or inflammation. Large amount of stool in the rectum. No  free intraperitoneal gas or fluid. There is degenerative disease  throughout the spine.      Impression:       IMPRESSION:  1. Right lower lobe pneumonia.  2. Cholelithiasis.  3. Marked prostate gland enlargement.  4. Mild bilateral hydronephrosis which may be due to the enlarged  prostate gland and mild urinary bladder wall thickening.  5. Colonic diverticula without acute diverticulitis.  6. Large amount of stool in the rectum.    AUSTIN HANNON MD      Testing Performed By     Lab - Abbreviation Name Director Address Valid Date Range    104 - Rad Rslts RADIOLOGY RESULTS Unknown Unknown 02/16/05 1553 - Present            Encounter-Level Documents:     There are no encounter-level documents.      Order-Level Documents:     There are no order-level documents.

## 2018-01-16 NOTE — IP AVS SNAPSHOT
MRN:[id number]                      After Visit Summary   1/16/2018    Chuy Barrera    MRN: [id number]           Thank you!     Thank you for choosing [facility] for your care. Our goal is always to provide you with excellent care. Hearing back from our patients is one way we can continue to improve our services. Please take a few minutes to complete the written survey that you may receive in the mail after you visit with us. Thank you!        Patient Information     Date Of Birth          9/5/1932        Designated Caregiver       Most Recent Value    Caregiver    Will someone help with your care after discharge? yes    Name of designated caregiver Allison    Phone number of caregiver [phone]    Caregiver address [address] [facility] [address], [place] [address]      About your hospital stay     You were admitted on:  January 17, 2018 You last received care in the:  [place] [address] [facility]    You were discharged on:  January 18, 2018        Reason for your hospital stay       Aspiration pneumonia                  Who to Call     For medical emergencies, please call 911.  For non-urgent questions about your medical care, please call your primary care provider or clinic, [phone]          Attending Provider     Provider Specialty    Phani Love MD Emergency Medicine    McgarryAndrea MD Internal Medicine       Primary Care Provider Office Phone # Fax #    Piero Wallace [phone]      After Care Instructions     Activity       Your activity upon discharge: activity as tolerated            Diet       Follow this diet upon discharge: Regular Diet Adult                  Follow-up Appointments     Follow-up and recommended labs and tests        Follow up with primary care provider, Piero Wallace, as needed.                  Additional Services     Home Care OT Referral for Hospital Discharge       OT to eval and treat    Your provider  "has ordered home care - occupational therapy. If you have not been contacted within 2 days of your discharge please call the department phone number listed on the top of this document.            Home Care PT Referral for Hospital Discharge       PT to eval and treat    Your provider has ordered home care - physical therapy. If you have not been contacted within 2 days of your discharge please call the department phone number listed on the top of this document.                  Pending Results     Date and Time Order Name Status Description    2018 0029 Blood culture Preliminary     2018 0029 Blood culture Preliminary             Statement of Approval     Ordered          18 1002  I have reviewed and agree with all the recommendations and orders detailed in this document.  EFFECTIVE NOW     Approved and electronically signed by:  Radha Beckford MD             Admission Information     Date & Time Provider Department Dept. Phone    2018 Andrea Mcgarry MD [place] [address] [facility] [phone]      Your Vitals Were     Blood Pressure Temperature Respirations Height Weight Pulse Oximetry    150/86 (BP Location: Right arm) 98.9  F (37.2  C) (Oral) 18 1.905 m (6' 3\") 78 kg (172 lb) 94%    BMI (Body Mass Index)                   21.5 kg/m2           MyChart Information     [employer] lets you send messages to your doctor, view your test results, renew your prescriptions, schedule appointments and more. To sign up, go to [email or web address] . Click on \"Log in\" on the left side of the screen, which will take you to the Welcome page. Then click on \"Sign up Now\" on the right side of the page.     You will be asked to enter the access code listed below, as well as some personal information. Please follow the directions to create your username and password.     Your access code is: [email or web address]  Expires: 2018  4:35 PM     Your access code will  in 90 days. " If you need help or a new code, please call your [facility] clinic or [phone].        Care EveryWhere ID     This is your Care EveryWhere ID. This could be used by other organizations to access your [facility] medical records  [id number]        Equal Access to Services     KAYLIKERRY ALKA : Hadii aad ku hadrenareli Soalmaali, waaxda luqadaha, qaybta kaalmada adeegyada, rafael rosio ceciamilcar escobar juliannelesvia ambrose. So [facility] [phone].    ATENCIÓN: Si habla español, tiene a griffin disposición servicios gratuitos de asistencia lingüística. Panfilo al [phone].    We comply with applicable federal civil rights laws and [place] laws. We do not discriminate on the basis of race, color, national origin, age, disability, sex, sexual orientation, or gender identity.               Review of your medicines      START taking        Dose / Directions    amoxicillin-clavulanate 875-125 MG per tablet   Commonly known as:  AUGMENTIN   Used for:  Pneumonia of right lower lobe due to infectious organism (H)        Dose:  1 tablet   Take 1 tablet by mouth 2 times daily for 6 days   Quantity:  12 tablet   Refills:  0       QUEtiapine 25 MG tablet   Commonly known as:  SEROquel   Used for:  Senile dementia, with behavioral disturbance        Dose:  25 mg   Take 1 tablet (25 mg) by mouth nightly as needed For agitation, insomnia   Quantity:  30 tablet   Refills:  3            Where to get your medicines      These medications were sent to [facility] [place], [place] - [address]  [address] [place] [address] [address]     Phone:  [phone]     amoxicillin-clavulanate 875-125 MG per tablet    QUEtiapine 25 MG tablet               ANTIBIOTIC INSTRUCTION     You've Been Prescribed an Antibiotic - Now What?  Your healthcare team thinks that you or your loved one might have an infection. Some infections can be treated with antibiotics, which are powerful, life-saving drugs. Like all medications, antibiotics have side  effects and should only be used when necessary. There are some important things you should know about your antibiotic treatment.      Your healthcare team may run tests before you start taking an antibiotic.    Your team may take samples (e.g., from your blood, urine or other areas) to run tests to look for bacteria. These test can be important to determine if you need an antibiotic at all and, if you do, which antibiotic will work best.      Within a few days, your healthcare team might change or even stop your antibiotic.    Your team may start you on an antibiotic while they are working to find out what is making you sick.    Your team might change your antibiotic because test results show that a different antibiotic would be better to treat your infection.    In some cases, once your team has more information, they learn that you do not need an antibiotic at all. They may find out that you don't have an infection, or that the antibiotic you're taking won't work against your infection. For example, an infection caused by a virus can't be treated with antibiotics. Staying on an antibiotic when you don't need it is more likely to be harmful than helpful.      You may experience side effects from your antibiotic.    Like all medications, antibiotics have side effects. Some of these can be serious.    Let you healthcare team know if you have any known allergies when you are admitted to the hospital.    One significant side effect of nearly all antibiotics is the risk of severe and sometimes deadly diarrhea caused by Clostridium difficile (C. Difficile). This occurs when a person takes antibiotics because some good germs are destroyed. Antibiotic use allows C. diificile to take over, putting patients at high risk for this serious infection.    As a patient or caregiver, it is important to understand your or your loved one's antibiotic treatment. It is especially important for caregivers to speak up when patients can't  speak for themselves. Here are some important questions to ask your healthcare team.    What infection is this antibiotic treating and how do you know I have that infection?    What side effects might occur from this antibiotic?    How long will I need to take this antibiotic?    Is it safe to take this antibiotic with other medications or supplements (e.g., vitamins) that I am taking?     Are there any special directions I need to know about taking this antibiotic? For example, should I take it with food?    How will I be monitored to know whether my infection is responding to the antibiotic?    What tests may help to make sure the right antibiotic is prescribed for me?      Information provided by:  www.cdc.gov/getsmart  U.S. Department of Health and Human Services  Centers for disease Control and Prevention  National Center for Emerging and Zoonotic Infectious Diseases  Division of Healthcare Quality Promotion         Protect others around you: Learn how to safely use, store and throw away your medicines at www.disposemymeds.org.             Medication List: This is a list of all your medications and when to take them. Check marks below indicate your daily home schedule. Keep this list as a reference.      Medications           Morning Afternoon Evening Bedtime As Needed    amoxicillin-clavulanate 875-125 MG per tablet   Commonly known as:  AUGMENTIN   Take 1 tablet by mouth 2 times daily for 6 days                                      QUEtiapine 25 MG tablet   Commonly known as:  SEROquel   Take 1 tablet (25 mg) by mouth nightly as needed For agitation, insomnia   Last time this was given:  25 mg on 1/18/2018  8:36 AM

## 2018-01-17 ENCOUNTER — APPOINTMENT (OUTPATIENT)
Dept: CT IMAGING | Facility: CLINIC | Age: 83
DRG: 871 | End: 2018-01-17
Attending: EMERGENCY MEDICINE
Payer: MEDICARE

## 2018-01-17 PROBLEM — J18.9 PNEUMONIA: Status: ACTIVE | Noted: 2018-01-17

## 2018-01-17 LAB
ALBUMIN SERPL-MCNC: 3.8 G/DL (ref 3.4–5)
ALBUMIN UR-MCNC: NEGATIVE MG/DL
ALP SERPL-CCNC: 113 U/L (ref 40–150)
ALT SERPL W P-5'-P-CCNC: 84 U/L (ref 0–70)
ANION GAP SERPL CALCULATED.3IONS-SCNC: 9 MMOL/L (ref 3–14)
APPEARANCE UR: CLEAR
AST SERPL W P-5'-P-CCNC: 99 U/L (ref 0–45)
BASOPHILS # BLD AUTO: 0 10E9/L (ref 0–0.2)
BASOPHILS NFR BLD AUTO: 0 %
BILIRUB SERPL-MCNC: 1.9 MG/DL (ref 0.2–1.3)
BILIRUB UR QL STRIP: NEGATIVE
BUN SERPL-MCNC: 20 MG/DL (ref 7–30)
CALCIUM SERPL-MCNC: 9 MG/DL (ref 8.5–10.1)
CHLORIDE SERPL-SCNC: 103 MMOL/L (ref 94–109)
CO2 SERPL-SCNC: 25 MMOL/L (ref 20–32)
COLOR UR AUTO: YELLOW
CREAT SERPL-MCNC: 1 MG/DL (ref 0.66–1.25)
CREAT SERPL-MCNC: 1.1 MG/DL (ref 0.66–1.25)
DIFFERENTIAL METHOD BLD: ABNORMAL
EOSINOPHIL # BLD AUTO: 0 10E9/L (ref 0–0.7)
EOSINOPHIL NFR BLD AUTO: 0 %
ERYTHROCYTE [DISTWIDTH] IN BLOOD BY AUTOMATED COUNT: 13.1 % (ref 10–15)
GFR SERPL CREATININE-BSD FRML MDRD: 64 ML/MIN/1.7M2
GFR SERPL CREATININE-BSD FRML MDRD: 71 ML/MIN/1.7M2
GLUCOSE SERPL-MCNC: 109 MG/DL (ref 70–99)
GLUCOSE UR STRIP-MCNC: NEGATIVE MG/DL
HCT VFR BLD AUTO: 44.1 % (ref 40–53)
HGB BLD-MCNC: 15.5 G/DL (ref 13.3–17.7)
HGB UR QL STRIP: ABNORMAL
INTERPRETATION ECG - MUSE: NORMAL
KETONES UR STRIP-MCNC: NEGATIVE MG/DL
LACTATE SERPL-SCNC: 3.1 MMOL/L (ref 0.4–2)
LEUKOCYTE ESTERASE UR QL STRIP: ABNORMAL
LYMPHOCYTES # BLD AUTO: 0.2 10E9/L (ref 0.8–5.3)
LYMPHOCYTES NFR BLD AUTO: 1 %
MCH RBC QN AUTO: 32.2 PG (ref 26.5–33)
MCHC RBC AUTO-ENTMCNC: 35.1 G/DL (ref 31.5–36.5)
MCV RBC AUTO: 92 FL (ref 78–100)
MONOCYTES # BLD AUTO: 1.5 10E9/L (ref 0–1.3)
MONOCYTES NFR BLD AUTO: 9 %
MUCOUS THREADS #/AREA URNS LPF: PRESENT /LPF
NEUTROPHILS # BLD AUTO: 15.4 10E9/L (ref 1.6–8.3)
NEUTROPHILS NFR BLD AUTO: 90 %
NITRATE UR QL: NEGATIVE
PH UR STRIP: 5 PH (ref 5–7)
PLATELET # BLD AUTO: 205 10E9/L (ref 150–450)
PLATELET # BLD AUTO: 232 10E9/L (ref 150–450)
PLATELET # BLD EST: ABNORMAL 10*3/UL
POTASSIUM SERPL-SCNC: 3.3 MMOL/L (ref 3.4–5.3)
POTASSIUM SERPL-SCNC: 4.2 MMOL/L (ref 3.4–5.3)
PROT SERPL-MCNC: 7.3 G/DL (ref 6.8–8.8)
RBC # BLD AUTO: 4.82 10E12/L (ref 4.4–5.9)
RBC #/AREA URNS AUTO: 8 /HPF (ref 0–2)
RBC MORPH BLD: NORMAL
SODIUM SERPL-SCNC: 137 MMOL/L (ref 133–144)
SOURCE: ABNORMAL
SP GR UR STRIP: 1.01 (ref 1–1.03)
UROBILINOGEN UR STRIP-MCNC: NORMAL MG/DL (ref 0–2)
WBC # BLD AUTO: 17.1 10E9/L (ref 4–11)
WBC #/AREA URNS AUTO: 13 /HPF (ref 0–2)

## 2018-01-17 PROCEDURE — 36415 COLL VENOUS BLD VENIPUNCTURE: CPT | Performed by: INTERNAL MEDICINE

## 2018-01-17 PROCEDURE — 25000128 H RX IP 250 OP 636: Performed by: EMERGENCY MEDICINE

## 2018-01-17 PROCEDURE — 96361 HYDRATE IV INFUSION ADD-ON: CPT

## 2018-01-17 PROCEDURE — 85049 AUTOMATED PLATELET COUNT: CPT | Performed by: INTERNAL MEDICINE

## 2018-01-17 PROCEDURE — 83605 ASSAY OF LACTIC ACID: CPT | Performed by: EMERGENCY MEDICINE

## 2018-01-17 PROCEDURE — 74177 CT ABD & PELVIS W/CONTRAST: CPT

## 2018-01-17 PROCEDURE — 25000128 H RX IP 250 OP 636: Performed by: INTERNAL MEDICINE

## 2018-01-17 PROCEDURE — 96367 TX/PROPH/DG ADDL SEQ IV INF: CPT

## 2018-01-17 PROCEDURE — 81001 URINALYSIS AUTO W/SCOPE: CPT | Performed by: EMERGENCY MEDICINE

## 2018-01-17 PROCEDURE — 12000000 ZZH R&B MED SURG/OB

## 2018-01-17 PROCEDURE — 25000132 ZZH RX MED GY IP 250 OP 250 PS 637: Mod: GY | Performed by: INTERNAL MEDICINE

## 2018-01-17 PROCEDURE — 40000916 ZZH STATISTIC SITTER, NIGHT HOURS

## 2018-01-17 PROCEDURE — 96375 TX/PRO/DX INJ NEW DRUG ADDON: CPT

## 2018-01-17 PROCEDURE — 87040 BLOOD CULTURE FOR BACTERIA: CPT | Performed by: EMERGENCY MEDICINE

## 2018-01-17 PROCEDURE — A9270 NON-COVERED ITEM OR SERVICE: HCPCS | Mod: GY | Performed by: INTERNAL MEDICINE

## 2018-01-17 PROCEDURE — 40000915 ZZH STATISTIC SITTER, EVENING HOURS

## 2018-01-17 PROCEDURE — 70450 CT HEAD/BRAIN W/O DYE: CPT

## 2018-01-17 PROCEDURE — 25000125 ZZHC RX 250: Performed by: EMERGENCY MEDICINE

## 2018-01-17 PROCEDURE — 84132 ASSAY OF SERUM POTASSIUM: CPT | Performed by: INTERNAL MEDICINE

## 2018-01-17 PROCEDURE — 82565 ASSAY OF CREATININE: CPT | Performed by: INTERNAL MEDICINE

## 2018-01-17 PROCEDURE — 99222 1ST HOSP IP/OBS MODERATE 55: CPT | Mod: AI | Performed by: INTERNAL MEDICINE

## 2018-01-17 PROCEDURE — 99207 ZZC APP CREDIT; MD BILLING SHARED VISIT: CPT | Performed by: INTERNAL MEDICINE

## 2018-01-17 PROCEDURE — 96365 THER/PROPH/DIAG IV INF INIT: CPT

## 2018-01-17 RX ORDER — KETOROLAC TROMETHAMINE 15 MG/ML
15 INJECTION, SOLUTION INTRAMUSCULAR; INTRAVENOUS ONCE
Status: DISCONTINUED | OUTPATIENT
Start: 2018-01-17 | End: 2018-01-17 | Stop reason: CLARIF

## 2018-01-17 RX ORDER — POTASSIUM CL/LIDO/0.9 % NACL 10MEQ/0.1L
10 INTRAVENOUS SOLUTION, PIGGYBACK (ML) INTRAVENOUS
Status: DISCONTINUED | OUTPATIENT
Start: 2018-01-17 | End: 2018-01-18 | Stop reason: HOSPADM

## 2018-01-17 RX ORDER — NALOXONE HYDROCHLORIDE 0.4 MG/ML
.1-.4 INJECTION, SOLUTION INTRAMUSCULAR; INTRAVENOUS; SUBCUTANEOUS
Status: DISCONTINUED | OUTPATIENT
Start: 2018-01-17 | End: 2018-01-18 | Stop reason: HOSPADM

## 2018-01-17 RX ORDER — IOPAMIDOL 755 MG/ML
86 INJECTION, SOLUTION INTRAVASCULAR ONCE
Status: COMPLETED | OUTPATIENT
Start: 2018-01-17 | End: 2018-01-17

## 2018-01-17 RX ORDER — POTASSIUM CHLORIDE 1.5 G/1.58G
20-40 POWDER, FOR SOLUTION ORAL
Status: DISCONTINUED | OUTPATIENT
Start: 2018-01-17 | End: 2018-01-18 | Stop reason: HOSPADM

## 2018-01-17 RX ORDER — POTASSIUM CHLORIDE 1500 MG/1
20-40 TABLET, EXTENDED RELEASE ORAL
Status: DISCONTINUED | OUTPATIENT
Start: 2018-01-17 | End: 2018-01-18 | Stop reason: HOSPADM

## 2018-01-17 RX ORDER — SODIUM CHLORIDE 9 MG/ML
INJECTION, SOLUTION INTRAVENOUS CONTINUOUS
Status: DISCONTINUED | OUTPATIENT
Start: 2018-01-17 | End: 2018-01-17

## 2018-01-17 RX ORDER — AMOXICILLIN 250 MG
1 CAPSULE ORAL 2 TIMES DAILY PRN
Status: DISCONTINUED | OUTPATIENT
Start: 2018-01-17 | End: 2018-01-18 | Stop reason: HOSPADM

## 2018-01-17 RX ORDER — MAGNESIUM CARB/ALUMINUM HYDROX 105-160MG
148 TABLET,CHEWABLE ORAL
Status: DISCONTINUED | OUTPATIENT
Start: 2018-01-17 | End: 2018-01-18 | Stop reason: HOSPADM

## 2018-01-17 RX ORDER — ONDANSETRON 2 MG/ML
4 INJECTION INTRAMUSCULAR; INTRAVENOUS EVERY 6 HOURS PRN
Status: DISCONTINUED | OUTPATIENT
Start: 2018-01-17 | End: 2018-01-18 | Stop reason: HOSPADM

## 2018-01-17 RX ORDER — POTASSIUM CHLORIDE 29.8 MG/ML
20 INJECTION INTRAVENOUS
Status: DISCONTINUED | OUTPATIENT
Start: 2018-01-17 | End: 2018-01-18 | Stop reason: HOSPADM

## 2018-01-17 RX ORDER — POTASSIUM CHLORIDE 7.45 MG/ML
10 INJECTION INTRAVENOUS
Status: DISCONTINUED | OUTPATIENT
Start: 2018-01-17 | End: 2018-01-18 | Stop reason: HOSPADM

## 2018-01-17 RX ORDER — ACETAMINOPHEN 650 MG/1
650 SUPPOSITORY RECTAL EVERY 4 HOURS PRN
Status: DISCONTINUED | OUTPATIENT
Start: 2018-01-17 | End: 2018-01-18 | Stop reason: HOSPADM

## 2018-01-17 RX ORDER — POLYETHYLENE GLYCOL 3350 17 G/17G
17 POWDER, FOR SOLUTION ORAL DAILY PRN
Status: DISCONTINUED | OUTPATIENT
Start: 2018-01-17 | End: 2018-01-18 | Stop reason: HOSPADM

## 2018-01-17 RX ORDER — QUETIAPINE FUMARATE 25 MG/1
25 TABLET, FILM COATED ORAL 2 TIMES DAILY
Status: DISCONTINUED | OUTPATIENT
Start: 2018-01-17 | End: 2018-01-18 | Stop reason: HOSPADM

## 2018-01-17 RX ORDER — ACETAMINOPHEN 325 MG/1
650 TABLET ORAL EVERY 4 HOURS PRN
Status: DISCONTINUED | OUTPATIENT
Start: 2018-01-17 | End: 2018-01-18 | Stop reason: HOSPADM

## 2018-01-17 RX ORDER — ONDANSETRON 4 MG/1
4 TABLET, ORALLY DISINTEGRATING ORAL EVERY 6 HOURS PRN
Status: DISCONTINUED | OUTPATIENT
Start: 2018-01-17 | End: 2018-01-18 | Stop reason: HOSPADM

## 2018-01-17 RX ORDER — AMOXICILLIN AND CLAVULANATE POTASSIUM 400; 57 MG/5ML; MG/5ML
875 POWDER, FOR SUSPENSION ORAL EVERY 12 HOURS SCHEDULED
Status: DISCONTINUED | OUTPATIENT
Start: 2018-01-17 | End: 2018-01-18 | Stop reason: HOSPADM

## 2018-01-17 RX ORDER — AMOXICILLIN 250 MG
2 CAPSULE ORAL 2 TIMES DAILY PRN
Status: DISCONTINUED | OUTPATIENT
Start: 2018-01-17 | End: 2018-01-18 | Stop reason: HOSPADM

## 2018-01-17 RX ADMIN — IOPAMIDOL 86 ML: 755 INJECTION, SOLUTION INTRAVENOUS at 00:24

## 2018-01-17 RX ADMIN — AMOXICILLIN AND CLAVULANATE POTASSIUM 875 MG: 400; 57 POWDER, FOR SUSPENSION ORAL at 21:05

## 2018-01-17 RX ADMIN — POTASSIUM CHLORIDE 20 MEQ: 1.5 POWDER, FOR SOLUTION ORAL at 11:32

## 2018-01-17 RX ADMIN — VANCOMYCIN HYDROCHLORIDE 1750 MG: 5 INJECTION, POWDER, LYOPHILIZED, FOR SOLUTION INTRAVENOUS at 01:06

## 2018-01-17 RX ADMIN — SODIUM CHLORIDE 66 ML: 9 INJECTION, SOLUTION INTRAVENOUS at 00:24

## 2018-01-17 RX ADMIN — SODIUM CHLORIDE 1000 ML: 9 INJECTION, SOLUTION INTRAVENOUS at 00:38

## 2018-01-17 RX ADMIN — QUETIAPINE 25 MG: 25 TABLET ORAL at 21:05

## 2018-01-17 RX ADMIN — SODIUM CHLORIDE: 9 INJECTION, SOLUTION INTRAVENOUS at 03:14

## 2018-01-17 RX ADMIN — PIPERACILLIN SODIUM AND TAZOBACTAM SODIUM 4.5 G: 36; 4.5 INJECTION, POWDER, FOR SOLUTION INTRAVENOUS at 06:10

## 2018-01-17 RX ADMIN — ENOXAPARIN SODIUM 40 MG: 40 INJECTION SUBCUTANEOUS at 03:16

## 2018-01-17 RX ADMIN — AMOXICILLIN AND CLAVULANATE POTASSIUM 875 MG: 400; 57 POWDER, FOR SUSPENSION ORAL at 14:53

## 2018-01-17 RX ADMIN — Medication 10 MEQ: at 03:57

## 2018-01-17 RX ADMIN — Medication 10 MEQ: at 06:25

## 2018-01-17 RX ADMIN — Medication 10 MEQ: at 05:08

## 2018-01-17 RX ADMIN — QUETIAPINE 25 MG: 25 TABLET ORAL at 14:41

## 2018-01-17 RX ADMIN — TAZOBACTAM SODIUM AND PIPERACILLIN SODIUM 3.38 G: 375; 3 INJECTION, SOLUTION INTRAVENOUS at 00:41

## 2018-01-17 RX ADMIN — SODIUM CHLORIDE, POTASSIUM CHLORIDE, SODIUM LACTATE AND CALCIUM CHLORIDE 500 ML: 600; 310; 30; 20 INJECTION, SOLUTION INTRAVENOUS at 01:35

## 2018-01-17 NOTE — H&P
[facility]    History and Physical  Hospitalist       Date of Admission:  1/16/2018  Date of Service (when I saw the patient): 01/17/18    Assessment & Plan   Chuy Barrera is a 85 year old male who presents with nausea weakness    Nausea/weakness  Pneumonia of his right lower lobe, organism unknown  Mr. Barrera presented to the hospital today after having an episode of nausea and vomiting.  He normally lays on the television he watches it.  He had the episode of nausea and vomiting and after it happened his wife is unable to get him to sitting or standing position so she contacted EMS.  Evaluation in the emergency department his vitals are stable and he is afebrile.  Chest x-ray was done and showed a right lower lobe pneumonia.  CT confirmed this.  He was started on Vanco and Zosyn in the emergency department.  -Admit inpatient  -We will continue Zosyn.  Will hold on Vanco as is his limit exposure to healthcare so my suspicion for MRSA is quite low.  I am worried about aspiration which should cover.  -Oxygen as needed to keep sats over 92%  -IV hydration  -Blood cultures are pending  -I discussed at length with his wife regarding possible routes be to take with management of Mr. Barrera's pneumonia.  With right lower lobe pneumonia given his dementia, I am worried about aspiration.  However, in speaking with his wife, she does not want aggressive measures.  She also does not want him to have to limit his diet.  Will defer speech path evaluation at this time leave the patient with regular diet.  The wife does feel that he eats fine and does not have any problems with aspiration when eating.    Dementia  This is fairly advanced and history is provided by the wife and the medical record.  He is slated to move into assisted living versus nursing home/memory care at some point in the near future.      Elevated AST/ALT and total bili  No clear explanation for this.  Again I discussed with the wife the  above findings and potential pursuit of an ultrasound to see if he has gallstones.  She again did not want aggressive measures such as surgery if anything was found on the ultrasound.  Will defer the ultrasound for now.    Enlarged prostate with bilateral hydronephrosis seen on CT   Again this was incidentally noticed.  The wife does not want to pursue any further evaluation.    DVT Prophylaxis: Enoxaparin (Lovenox) SQ  Code Status: DNR / DNI.  I had an extensive discussion with the wife.  She says she been dealing with Mr. Barrera's dementia for 5 years and has realized that he is progressing will need transition to an assisted living/nursing home/primary care, which is currently being arranged.  Given his underlying dementia she does not want aggressive measures taken and she is very reasonable regarding his prognosis.  She does not want aggressive procedures done.  We did discuss potentially having him on do not hospitalize; she is not quite ready for this.    Disposition: Expected discharge in 2+ days once the above issues are resolved..    Andrea Mcgarry MD  [phone] (P)  Text Page     Primary Care Physician   [facility]    Chief Complaint   Nausea vomiting, weakness    History is obtained from the patient's wife and medical records    History of Present Illness   Chuy Barrera is a 85 year old male who presents with weakness and nausea vomiting.  Mr. Barrera was in his usual state of health at home when he had an episode of vomiting.  He normally lays down in front of the television watches TV and he vomited while he was watching TV.  His wife tried to get him up but he was too weak to sit and stand.  She subsequently called EMS on.  In the emergency department his vitals were stable.  He did have chest x-ray which showed a right lower lobe pneumonia.  He has had a CT scan which was confirmed a right lower lobe pneumonia.  The CT scan also showed cholelithiasis prostate enlargement as  well as mild bilateral hydronephrosis.  Mr. Barrera does not have any complaints but given his advanced dementia I cannot get much history from him.    Past Medical History    I have reviewed this patient's medical history and updated it with pertinent information if needed.   Past Medical History:   Diagnosis Date     Late onset Alzheimer's disease with behavioral disturbance 9/20/2017     Personal history of malignant melanoma of skin        Past Surgical History   I have reviewed this patient's surgical history and updated it with pertinent information if needed.  Past Surgical History:   Procedure Laterality Date     SURGICAL HISTORY OF -   4/30/99    Wide excision chest lesion     SURGICAL HISTORY OF -   4/20/99    Bx chest lesion, Dx melanoma     SURGICAL HISTORY OF -       s/p Hernia repair     SURGICAL HISTORY OF -   12/5/00    Bx chest lesion scar, neg.       Prior to Admission Medications   None     Allergies   No Known Allergies    Social History   I have reviewed this patient's social history and updated it with pertinent information if needed. Chuy Barrera  reports that he has never smoked. He does not have any smokeless tobacco history on file. He reports that he drinks alcohol. He reports that he does not use illicit drugs.    Family History   I have reviewed this patient's family history and updated it with pertinent information if needed.   Family History   Problem Relation Age of Onset     C.A.D. Maternal Grandfather        Review of Systems   Unable to assess secondary to patient's dementia    Physical Exam   Temp: 97.5  F (36.4  C) Temp src: Oral BP: 94/49   Heart Rate: 80 Resp: 16 SpO2: 94 % O2 Device: None (Room air)    Vital Signs with Ranges  172 lbs 0 oz    Constitutional: alert, unable to assess orientation given his dementia  Eyes: EOMI, PERRL  HEENT: OP clear  Respiratory: Coarse at his right base  Cardiovascular: RRR without m/r/g  GI: soft, nontender, nondistended, no  HSM  Lymph/Hematologic: no cervical LAD  Genitourinary: deferred  Skin: no rashes or lesions grossly  Musculoskeletal: no deformities or arthritis  Neurologic: CN II-XII, FRANCO  Psychiatric: Unable to assess    Data   Data reviewed today:  I personally reviewed the chest x-ray image(s) showing Right lower lobe pneumonia and the chest CT image(s) showing Findings as discussed above in the history of present illness..    Recent Labs  Lab 01/16/18  2320   WBC 17.1*   HGB 15.5   MCV 92         POTASSIUM 3.3*   CHLORIDE 103   CO2 25   BUN 20   CR 1.10   ANIONGAP 9   KAYLI 9.0   *   ALBUMIN 3.8   PROTTOTAL 7.3   BILITOTAL 1.9*   ALKPHOS 113   ALT 84*   AST 99*       Recent Results (from the past 24 hour(s))   XR Chest 2 Views    Narrative    XR CHEST 2 VW  1/17/2018 12:04 AM      HISTORY: Suspect pneumonia.    COMPARISON: None.    FINDINGS: The heart size is normal. The thoracic aorta is calcified  and mildly tortuous. There is a large right lower lobe infiltrate. The  left lung is clear. No pneumothorax or pleural effusion.      Impression    IMPRESSION: Right lower lobe pneumonia.   CT Head w/o Contrast    Narrative    CT SCAN OF THE HEAD WITHOUT CONTRAST  1/17/2018 12:23 AM     HISTORY: AMS, dementia, generalized weakness.     TECHNIQUE: Axial images of the head and coronal reformations without  IV contrast material. Radiation dose for this scan was reduced using  automated exposure control, adjustment of the mA and/or kV according  to patient size, or iterative reconstruction technique.    COMPARISON: None.    FINDINGS: There is generalized atrophy of the brain. There is low  attenuation in the white matter of the cerebral hemispheres consistent  with sequelae of small vessel ischemic disease. There is no evidence  of intracranial hemorrhage, mass, acute infarct or anomaly.     The visualized portions of the sinuses and mastoids appear normal.  There is no evidence of trauma.      Impression     IMPRESSION: No acute abnormality.   CT Abdomen Pelvis w Contrast    Narrative    CT ABDOMEN PELVIS W CONTRAST  1/17/2018 12:25 AM      HISTORY: Abdominal pain, nausea, and vomiting.    TECHNIQUE: CT abdomen and pelvis with intravenous contrast. Radiation  dose for this scan was reduced using automated exposure control,  adjustment of the mA and/or kV according to patient size, or iterative  reconstruction technique. 86 mL Isovue-370.      COMPARISON: None.    FINDINGS:  Abdomen: There is a right lower lobe infiltrate consistent with  pneumonia. Dependent atelectasis at the lung bases. There are coronary  artery atherosclerotic calcifications. The heart size is normal. The  right hemidiaphragm is elevated. The liver and spleen are normal in  appearance. There are multiple stones in the gallbladder. The pancreas  and adrenal glands are normal in appearance. There is mild dilatation  of the renal collecting systems bilaterally. The kidneys otherwise  appear normal. There is no abdominal or pelvic lymph node enlargement.  There is atherosclerotic calcification of the aorta and its branches.  No aneurysm.    Pelvis: The prostate gland is markedly enlarged. There is mild urinary  bladder wall thickening. The prostate gland enlargement and wall  thickening may be the cause of mild bilateral hydronephrosis. There  are colonic diverticula without acute diverticulitis. No bowel  obstruction or inflammation. Large amount of stool in the rectum. No  free intraperitoneal gas or fluid. There is degenerative disease  throughout the spine.      Impression    IMPRESSION:  1. Right lower lobe pneumonia.  2. Cholelithiasis.  3. Marked prostate gland enlargement.  4. Mild bilateral hydronephrosis which may be due to the enlarged  prostate gland and mild urinary bladder wall thickening.  5. Colonic diverticula without acute diverticulitis.  6. Large amount of stool in the rectum.

## 2018-01-17 NOTE — ED PROVIDER NOTES
"  History     Chief Complaint:  Nausea & Vomiting     The history is provided by the spouse and the EMS personnel.      Chuy Barrera is a 85-year-old male with a history of Alzheimer's disease who presents via EMS for evaluation of nausea and vomiting, increased bowel movement, and increased frequency of urination.  The patient's wife states that he was lying in bed face up watching television and when she returned to their bedroom the patient had vomited on himself.  She states that he was not able to get up after this on his own which is why she called EMS.  Wife states that the patient is normally able to walk unassisted.  No fever has been noted.  There has been no recent fall or trauma.  Wife states that he coughs at baseline but this has not increased.    Allergies:  No known drug allergies.      Medications:    The patient is currently on no regular medications.     Past Medical History:    Actinic Keratosis   Alzheimer's disease     Past Surgical History:    Wide excision of chest lesion, melanoma   Hernia repair     Family History:    History reviewed. No pertinent family history.     Social History:  Marital Status:   Presents to the ED via EMS  Tobacco Use: Never  Alcohol Use: Occasional   PCP: Deny Hernandez [facility]   The patient lives in his own home with his wife.      Review of Systems   Unable to perform ROS: Dementia     Physical Exam   Patient Vitals for the past 24 hrs:   BP Temp Temp src Heart Rate Resp SpO2 Height Weight   01/17/18 0145 129/84 - - 83 24 92 % - -   01/17/18 0130 94/49 - - 80 16 94 % - 78 kg (172 lb)   01/17/18 0115 93/52 - - - 10 95 % - -   01/17/18 0100 105/60 - - 87 16 95 % - -   01/17/18 0045 115/59 - - 90 18 98 % - -   01/17/18 0030 122/74 - - - - 95 % - -   01/16/18 2350 96/65 - - 88 - 96 % - -   01/16/18 2329 107/59 - - 101 - 95 % - -   01/16/18 2324 (!) 85/55 - - 100 - 93 % - -   01/16/18 2304 98/64 97.5  F (36.4  C) Oral 107 - 93 % 1.905 m (6' 3\") -    "   Physical Exam  Vitals: reviewed by me  General: Pt seen on hospital [facility], pleasant, cooperative, and alert to conversation but does ask inappropriate questions and is demented at baseline.  Eyes: Tracking well, clear conjunctiva BL  ENT: MMM, midline trachea.   Lungs:   No tachypnea noted, scant accessory muscle use with subcostal retractions, though this is minimal.  Does have positive breath sounds to both sides, coarse airways in bilateral bases  CV: Rate as above, regular rhythm.    Abd: Soft, but does have some potential tenderness to the epigastrium on palpation, intermittent guarding, no rebound.  No tenderness to palpation to lower abdomen.  We will note the patient is non evaluable given his demented state.  MSK: no peripheral edema or joint effusion.  No evidence of trauma  Skin: No rash, normal turgor and temperature  Neuro: Clear speech and no facial droop.  Moving all extremities spontaneously cranial nerves II through XII are intact.  Psych: Not RIS, no e/o AH/VH      Emergency Department Course   ECG:  @ 2313  Indication: Altered mental status   Vent. Rate 106 bpm. NH interval * ms. QRS duration 84 ms. QT/QTc 488/648 ms. P-R-T axis * 49 58.   Accelerated Junctional rhythm with occasional PVCs. Nonspecific T wave abnormality. Prolonged QT. Abnormal ECG.  Read @ 2313 by Dr. Love.     Imaging:  CT Head w/o Contrast  No acute abnormality.     CT Abdomen Pelvis a Contrast  1. Right lower lobe pneumonia.  2. Cholelithiasis.  3. Marked prostate gland enlargement.  4. Mild bilateral hydronephrosis which may be due to the enlarged  prostate gland and mild urinary bladder wall thickening.  5. Colonic diverticula without acute diverticulitis.  6. Large amount of stool in the rectum.    XR Chest 2 Views  Right lower lobe pneumonia.    Radiographic findings were communicated with the patient, family, and Admitting MD who voiced understanding of the findings.    Laboratory:  Blood:  Initial blood draw  collected at 2320  CBC:  WBC 17.1, HGB 15.5, , otherwise WNL   CMP: K 3.3, Bilirubin 1.9, ALT 84, AST 99, otherwise WNL (Creatinine 1.10)  Lactate: 3.3   Blood Culture: Pending ×2.      Repeat blood draw collected at 0100  Lactate: 3.1    Stool:  Occult Blood: Negative.     Urine:  UA: Clear yellow urine, trace blood, RBC 8, moderate leukocyte esterase, WBC 13, mucous present, otherwise WNL     Interventions:  (2320) Normal Saline, 1 liter, IV bolus   (0038) Normal Saline, 1 liter, IV bolus   (0041) Zosyn, 3.375 g, IV infusion   (0106) Vancomycin, 1750 mg, IV   (0135) Lactated Ringer's Bolus, 500 mL, IV     Emergency Department Course:  Nursing notes and vitals reviewed.    (2334) I entered the room with my scribe, obtained the history, and performed an exam of the patient as documented above.    EKG was done, interpretation as above.     A peripheral IV was established. Blood was drawn from the patient. This was sent for laboratory testing, findings above.      Urine sample was obtained and sent for laboratory analysis, findings above.     The patient was sent for a chest x-ray as well as CT scans of the Head, abdomen, and pelvis while in the emergency department, findings above.      (0100) I went to reevaluate the patient. Patient appears to be doing better. Discussed plan of care with the patient and his wife. They are amenable to plan.     Findings and plan explained to the patient and his wife who consents to admission.     (0103) I discussed the patient with Dr. Mcgarry of the hospitalist service, who will admit the patient to an inpatient bed for further monitoring, evaluation, and treatment.       Impression & Plan    CMS Diagnoses:   The patient has signs of Severe Sepsis as evidenced by:    1. 2 SIRS criteria, AND  2. Suspected infection, AND   3. Organ dysfunction: Lactic Acid >2    Time severe sepsis diagnosis confirmed = 2337 as this was the time when Lactate resulted, and the level was  >2      3 Hour Severe Sepsis Bundle Completion:  1. Initial Lactic Acid Result:   Recent Labs   Lab Test  01/17/18   0100  01/16/18   2320   LACT  3.1*  3.3*     2. Blood Cultures before Antibiotics: Yes  3. Broad Spectrum Antibiotics Administered: Yes     Anti-infectives (Future)    Start     Dose/Rate Route Frequency Ordered Stop    01/17/18 0038  vancomycin (VANCOCIN) 1,750 mg in NaCl 0.9 % 500 mL intermittent infusion      1,750 mg  over 2 Hours Intravenous ONCE 01/17/18 0037          4. 2500 ml of IV fluids.    the patient was transferred out of the ED prior to the 6 hour jorge alberto.       Medical Decision Making:Chuy Barrera is an 85-year-old male who presents to the emergency room with nausea, vomiting, found to have right lower lobe pneumonia.  I believe this does explain the majority of the patient's symptoms as he has generalized deconditioning and vomiting ×1 with an otherwise benign abdomen.  The patient is an evaluable, and for this reason CT scan of the abdomen was obtained when lactate was elevated.  The patient is receiving sepsis bundle for likely PNA as source, and elevated lactate.  On repeat perfusion exam at 0100, the patient is improving, he is pending a repeat lactate at this point.  I have spoken to Dr. Mcgarry who has generously agreed to accept the care of the patient and continue treatment for his pneumonia.  The patient again has a negative CT scan of the head, electrolytes are otherwise within normal limits, and he has no evidence of strokelike syndrome at this point.  CT abdomen pelvis shows gallstones with no evidence of cholecystitis, per report and discussion with hospitalist, ultrasound can safely be done upstairs is main issue does appear to be pneumonia.  I discussed this with the patient's wife at bedside who is in agreement with the plan of care and will monitor very carefully until bed available.    Diagnosis:    ICD-10-CM   1. Pneumonia of right lower lobe due to infectious  organism (H) J18.1           2. Sepsis, due to unspecified organism (H) A41.9     Disposition:  Admission       [facility] EMERGENCY DEPARTMENT        Scribe disclosure:  I, Eric Wong, am serving as a scribe on 1/16/2018 at 11:34 PM to personally document services performed by Dr. Love based on my observations and the provider's statements to me.                   Phani Love MD  01/17/18 0325

## 2018-01-17 NOTE — PROVIDER NOTIFICATION
Talked to Dr. Beckford about starting seroquel to help patient with his restlessness. Wife was ok with this as well. Also started VMP to help re-direct patient from being impulsive and getting up from the chair/bed. Patient pulled his IV earlier this AM, per MD it was ok to leave out and stop IVF. Will switch IV antibiotics to PO. Potassium was 3.3 overnight, was being replaced through IV. Had one bag of 10mEq left to give but that's when patient pulled IV out. Per MD, ok to give one packet of powder of 20mEq mixed with apple juice. Recheck for 1430.

## 2018-01-17 NOTE — PROGRESS NOTES
Hospitalist  Interval Note  1/17/2018    Patient admitted early morning for pneumonia. Stable. Has not needed oxygen.     Severe dementia at baseline and at high risk for delirium here. Persistently trying to get up and leave. Denies any symptoms (which wife states is his baseline). He already pulled out his IV.    Updated plan:  D/c IV and zosyn  Start augmentin BID  Schedule seroquel BID and reassess for prn dosing.    Radha Beckford MD

## 2018-01-17 NOTE — ED NOTES
"[facility]  ED Nurse Handoff Report    ED Chief complaint: Nausea & Vomiting (Sudden onset of N/V tonight, warm to touch, increased frequency of urination and bowel movement, hypotensive per EMS reports. History of dementia, baseline mentation no change. )      ED Diagnosis:   Final diagnoses:   Pneumonia of right lower lobe due to infectious organism (H)   Sepsis, due to unspecified organism (H)       Code Status: Special code    Allergies: No Known Allergies    Activity level - Baseline/Home:  Stand with Assist    Activity Level - Current:   Stand with Assist of 2     Needed?: No    Isolation: No  Infection: Not Applicable    Bariatric?: No    Vital Signs:   Vitals:    01/16/18 2304 01/16/18 2324 01/16/18 2329 01/16/18 2350   BP: 98/64 (!) 85/55 107/59 96/65   Temp: 97.5  F (36.4  C)      TempSrc: Oral      SpO2: 93% 93% 95% 96%   Height: 1.905 m (6' 3\")          Cardiac Rhythm: ,        Pain level:      Is this patient confused?: Yes, baseline confusion from dementia.     Patient Report: Initial Complaint: nausea vomiting and weakness  Focused Assessment: Patient with history of Dementia, lives at home with wife, transitioning to a memory care unit per wife's reports. Patient has sudden onset of nausea vomiting and weakness today prompted to activate EMS. Wife reports patient was warm to touch but did not take temperature at home. Reports increased frequency of urination last few days.   Tests Performed: labs, CT, CXR  Abnormal Results: wbc, lactic acid, CXR  Treatments provided: Zosyn, Vanco, NS bolus x 2 L,  ml bolus.     Family Comments: wife at bedside    OBS brochure/video discussed/provided to patient: N/A    ED Medications:   Medications   vancomycin (VANCOCIN) 1,750 mg in NaCl 0.9 % 500 mL intermittent infusion (1,750 mg Intravenous New Bag 1/17/18 0106)   0.9% sodium chloride BOLUS (0 mLs Intravenous Stopped 1/16/18 5902)   iopamidol (ISOVUE-370) solution 86 mL (86 mLs " Intravenous Given 1/17/18 0024)   Saline flush (66 mLs Intravenous Given 1/17/18 0024)   0.9% sodium chloride BOLUS (0 mLs Intravenous Stopped 1/17/18 0101)   piperacillin-tazobactam (ZOSYN) infusion 3.375 g (0 g Intravenous Stopped 1/17/18 0105)       Drips infusing?:  No      ED NURSE PHONE NUMBER: [phone]

## 2018-01-17 NOTE — PLAN OF CARE
Problem: Patient Care Overview  Goal: Plan of Care/Patient Progress Review  Outcome: Improving  Alert, oriented to person only. VSS on RA. Patient restless at times. Incontinent of bowel and bladder and having loose stools. IV fluids infusing. IV ABX ordered. IV potassium replacement infusing. On regular diet. Wife states that pt. has trouble swallowing pills, but eats and drinks normally. Fall precautions in place. Patient repositions himself in bed. Normally independent at home but has not been OOB here. PT consult in place. Sputum culture still needed. Lactic acid was 3.3 and then 3.1. Sepsis bundle initiated in ED. D/C plan is in 2+ days to facility.

## 2018-01-17 NOTE — PLAN OF CARE
Problem: Patient Care Overview  Goal: Plan of Care/Patient Progress Review  Outcome: Improving  Patient had VSS, denied any chest pain and SOB. LS are diminished but clear. Pulled out IV, of to leave out per MD. Rash on back, not new per wife. Mole on back covered. K+ replaced, recheck today at 1430. VPM in room for patient being impulsive. Seroquel ordered. Incontinent of bowel and bladder. SBAx1. Most likely d/c tomorrow.

## 2018-01-17 NOTE — PROGRESS NOTES
SW  D:  Wife approached writer to explain she has made arrangements for her  to be admitted to the [facility] located on the [facility]. The [facility] does have a memory care assisted living unit.  Wife and George are just waiting for the funding approval thru [employer].    Writer also received a call from Berenice, nurse  for the unit patient will be admitted to at the [facility]. ([phone]).  In her message, Berenice, stated she is hoping patient can be admitted to [facility] directly from the hospital.  Per care coordinator, patient may be ready for d/c on Thursday. [facility] requires their admits to arrive generally by 1200 to 1300.  Writer called Berenice and left a message that patient may be ready for d/c as soon as Thursday and asked if they are prepared to accept him this soon. Writer also faxed her clinical data for her review.      On day of discharge wife plans to transport patient.      P:  On Thursday, writer or care coordinator will speak with Berenice to complete assessment of patient's status in the hospital and determine when they can admit patient.

## 2018-01-17 NOTE — PHARMACY-ADMISSION MEDICATION HISTORY
Admission medication history interview status for the 1/16/2018  admission is complete. See EPIC admission navigator for prior to admission medications     Medication history source reliability:Good    Actions taken by pharmacist (provider contacted, etc): History obtained from wife.      Additional medication history information not noted on PTA med list :None    Medication reconciliation/reorder completed by provider prior to medication history? Yes    Time spent in this activity: 10 minutes    Prior to Admission medications    Not on File     Patient's wife states that patient is currently not taking any medications on a regular basis.     Vidya Craig, DonitaD, BCPS

## 2018-01-17 NOTE — PLAN OF CARE
Problem: Patient Care Overview  Goal: Plan of Care/Patient Progress Review  PT: Orders received, chart reviewed.  Pt admitted late last night, found to have PNA.  Per best practice, will hold PT evaluation until [id number] to allow for medical management.  Will continue to follow.

## 2018-01-18 VITALS
DIASTOLIC BLOOD PRESSURE: 86 MMHG | OXYGEN SATURATION: 94 % | BODY MASS INDEX: 21.39 KG/M2 | SYSTOLIC BLOOD PRESSURE: 150 MMHG | TEMPERATURE: 98.9 F | RESPIRATION RATE: 18 BRPM | WEIGHT: 172 LBS | HEIGHT: 75 IN

## 2018-01-18 LAB
ERYTHROCYTE [DISTWIDTH] IN BLOOD BY AUTOMATED COUNT: 13.8 % (ref 10–15)
HCT VFR BLD AUTO: 38.7 % (ref 40–53)
HGB BLD-MCNC: 13.4 G/DL (ref 13.3–17.7)
MCH RBC QN AUTO: 32.1 PG (ref 26.5–33)
MCHC RBC AUTO-ENTMCNC: 34.6 G/DL (ref 31.5–36.5)
MCV RBC AUTO: 93 FL (ref 78–100)
PLATELET # BLD AUTO: 190 10E9/L (ref 150–450)
RBC # BLD AUTO: 4.18 10E12/L (ref 4.4–5.9)
WBC # BLD AUTO: 12.4 10E9/L (ref 4–11)

## 2018-01-18 PROCEDURE — 85027 COMPLETE CBC AUTOMATED: CPT | Performed by: INTERNAL MEDICINE

## 2018-01-18 PROCEDURE — 40000893 ZZH STATISTIC PT IP EVAL DEFER

## 2018-01-18 PROCEDURE — 36415 COLL VENOUS BLD VENIPUNCTURE: CPT | Performed by: INTERNAL MEDICINE

## 2018-01-18 PROCEDURE — 25000132 ZZH RX MED GY IP 250 OP 250 PS 637: Mod: GY | Performed by: INTERNAL MEDICINE

## 2018-01-18 PROCEDURE — 99239 HOSP IP/OBS DSCHRG MGMT >30: CPT | Performed by: INTERNAL MEDICINE

## 2018-01-18 PROCEDURE — 25000128 H RX IP 250 OP 636: Performed by: INTERNAL MEDICINE

## 2018-01-18 PROCEDURE — A9270 NON-COVERED ITEM OR SERVICE: HCPCS | Mod: GY | Performed by: INTERNAL MEDICINE

## 2018-01-18 RX ORDER — QUETIAPINE FUMARATE 25 MG/1
25 TABLET, FILM COATED ORAL
Qty: 30 TABLET | Refills: 3 | Status: ON HOLD | OUTPATIENT
Start: 2018-01-18 | End: 2018-12-21

## 2018-01-18 RX ADMIN — ENOXAPARIN SODIUM 40 MG: 40 INJECTION SUBCUTANEOUS at 03:09

## 2018-01-18 RX ADMIN — QUETIAPINE 25 MG: 25 TABLET ORAL at 08:36

## 2018-01-18 RX ADMIN — AMOXICILLIN AND CLAVULANATE POTASSIUM 875 MG: 400; 57 POWDER, FOR SUSPENSION ORAL at 08:36

## 2018-01-18 NOTE — PLAN OF CARE
Problem: Patient Care Overview  Goal: Plan of Care/Patient Progress Review  Physical Therapy: Order received and chart reviewed. Per discussion with RN and chart review, patient is discharging shortly to [facility]. As discharge plan is established, will defer PT evaluation to next level of care and complete PT order at this time.

## 2018-01-18 NOTE — PROGRESS NOTES
Pt's Spouse Allison wants to have [facility], referral was sent and orders faxed to both [facility] and [facility].

## 2018-01-18 NOTE — PROGRESS NOTES
Planning potential discharge today.  Care Coordinator has conversed with Berenice, Nurse from [facility].  She is aware that we have had a sitter with pt through the night, that was mainly for his roommate, but pt is impulsive, so requires close monitoring.  They are able to accept pt today and prefer that he arrives by 1:00pm.  His spouse Allison is here.  She originally planned to transport, but now would like transportation set-up.  Nsg will page Hospitalist to see if they are able to see pt early.  Will set-up transport in the mean time.    Addendum 0900:  [facility] wheelchair transport has been set-up for 11:45am today.    Addendum 0920:  Received call from Berenice, from [facility], they prefer his medications are filled here and sent with pt.

## 2018-01-18 NOTE — PLAN OF CARE
Problem: Patient Care Overview  Goal: Discharge Needs Assessment  Outcome: Adequate for Discharge Date Met: 01/18/18  A&Ox1, oriented to self only. VSS on RA. Denies pain. No IV access. Up with 1. Oral abx. LS diminished. Discharge instructions explained and questions answered. All belongings sent with pt. Medications sent with wife. [facility] transport to facility at 1145, wife is riding along to redirect pt if needed.

## 2018-01-18 NOTE — PROGRESS NOTES
LEVI:  TY: Writer spoke with Berenice,  at [facility].  She has met patient prior to his hospital admit.  In relation to his mobility, she is fine knowing we have wanted patient to have supervision with mobility.  She does request d/c orders to include home PT and OT orders.  Care Coordinator will complete final arrangements for this discharge.

## 2018-01-18 NOTE — PLAN OF CARE
Problem: Patient Care Overview  Goal: Plan of Care/Patient Progress Review  Outcome: No Change  Alert to self only.  Regular diet.  Up with 1-2.  VSS on room air.  LS diminished with fine crackles on the right lower lobe .Denies pain.  Incontinent of bladder.  Restless at times but redirectable.  Nursing will continue to monitor.

## 2018-01-18 NOTE — DISCHARGE SUMMARY
[facility]  Discharge Summary  Hospitalist    Date of Admission:  1/16/2018  Date of Discharge:  1/18/2018  Discharging Provider: Radha Beckford MD    Discharge Diagnoses   Aspiration pneumonia of right lower lobe  Sepsis secondary to above, resolved  Dementia, advanced    History of Present Illness   Chuy Barrera is an 85 year old male with advanced dementia who presented to the ED after an aspiration event and transient weakness. Please see admission H&P for complete details.     Hospital Course   Chuy Barrera was admitted on 1/16/2018.  The following problems were addressed during his hospitalization:    Nausea/weakness  Pneumonia of his right lower lobe, organism unknown  Sepsis secondary to aspiration pneumonia  Mr. Barrera presented to the hospital after having an episode of nausea and vomiting. Lactate elevated, hypotensive, tachycardic and had leukocytosis. Chest x-ray was done and showed a right lower lobe pneumonia.  CT confirmed this.  He was started on Vanco and Zosyn in the emergency department.  -continued on Zosyn until he pulled out his IV.    -Blood cultures NGTD  -Discussed at length with his wife regarding possible routes be to take with management of Mr. Barrera's pneumonia.  With right lower lobe pneumonia given his dementia, the concern is for increased risk of recurrent aspiration. However, in speaking with his wife, she does not want aggressive measures.  She also does not want him to have to limit his diet. The wife does feel that he eats fine and does not have any problems with aspiration when eating.     Dementia, advanced  This is fairly advanced and history is provided by the wife and the medical record.  He is moving to [facility] at discharge.     Elevated AST/ALT and total bili  No clear explanation for this. This was discussed with the wife and potential pursuit of an ultrasound to see if he has gallstones. She again did not want aggressive  measures such as surgery if anything was found on the ultrasound.     Enlarged prostate with bilateral hydronephrosis seen on CT   Again this was incidentally noticed. The wife does not want to pursue any further evaluation.    Radha Beckford MD  ~~~~~~~~~~~~~~~~~~~~~~~~~~~~~~~~~~~~~~~~~~~~~~~~~~~~~~~~~~~    Pending Results   These results will be followed up by Hospitalist.  Unresulted Labs Ordered in the Past 30 Days of this Admission     Date and Time Order Name Status Description    1/17/2018 0029 Blood culture Preliminary     1/17/2018 0029 Blood culture Preliminary           Code Status   DNR / DNI       Primary Care Physician   Piero Wallace    Physical Exam   Temp: 98.9  F (37.2  C) Temp src: Oral BP: 150/86   Heart Rate: 63 Resp: 18 SpO2: 94 % O2 Device: None (Room air)    Vitals:    01/17/18 0130   Weight: 78 kg (172 lb)     Vital Signs with Ranges  Temp:  [98  F (36.7  C)-98.9  F (37.2  C)] 98.9  F (37.2  C)  Heart Rate:  [63-75] 63  Resp:  [18-20] 18  BP: (112-150)/(50-86) 150/86  SpO2:  [93 %-95 %] 94 %         Discharge Disposition   Discharged to long-term care facility  Condition at discharge: Stable    Consultations This Hospital Stay   PHARMACY TO [facility]  PHYSICAL THERAPY ADULT IP CONSULT    Time Spent on this Encounter   I, Radha Beckford, personally saw the patient today and spent 35 minutes discharging this patient.    Discharge Orders     Home Care PT Referral for Hospital Discharge     Home Care OT Referral for Hospital Discharge     Reason for your hospital stay   Aspiration pneumonia     Follow-up and recommended labs and tests    Follow up with primary care provider, Piero Wallace, as needed.     Activity   Your activity upon discharge: activity as tolerated     MD face to face encounter   Documentation of Face to Face and Certification for Home Health Services    I certify that patient: Chuy Barrera is under my care and that I, or a nurse practitioner or physician's assistant  working with me, had a face-to-face encounter that meets the physician face-to-face encounter requirements with this patient on: January 18, 2018.    This encounter with the patient was in whole, or in part, for the following medical condition, which is the primary reason for home health care: severe dementia, aspiration pneumonia.    I certify that, based on my findings, the following services are medically necessary home health services: Occupational Therapy and Physical Therapy.    My clinical findings support the need for the above services because: Occupational Therapy Services are needed to assess and treat cognitive ability and address ADL safety due to impairment in severe dementia, aspiration pneumonia, physical deconditioning. and Physical Therapy Services are needed to assess and treat the following functional impairments: severe dementia, aspiration pneumonia, physical deconditioning.    Further, I certify that my clinical findings support that this patient is homebound (i.e. absences from home require considerable and taxing effort and are for medical reasons or [employer] services or infrequently or of short duration when for other reasons) because: Mental health symptoms including: Mental health condition is exacerbated by exposure to crowds, unfamiliar environment or new stressful situations. and Patient gets lost or wanders to due to cognitive impairments...    Based on the above findings. I certify that this patient is confined to the home and needs intermittent skilled nursing care, physical therapy and/or speech therapy.  The patient is under my care, and I have initiated the establishment of the plan of care.  This patient will be followed by a physician who will periodically review the plan of care.  Physician/Provider to provide follow up care: Piero Wallace    Attending hospital physician (the Medicare certified [place] provider): Radha Beckford MD  Physician Signature: See electronic signature  associated with these discharge orders.  Date: 1/18/2018     DNR/DNI     Diet   Follow this diet upon discharge: Regular Diet Adult       Discharge Medications   Current Discharge Medication List      START taking these medications    Details   QUEtiapine (SEROQUEL) 25 MG tablet Take 1 tablet (25 mg) by mouth nightly as needed For agitation, insomnia  Qty: 30 tablet, Refills: 3    Associated Diagnoses: Senile dementia, with behavioral disturbance      amoxicillin-clavulanate (AUGMENTIN) 875-125 MG per tablet Take 1 tablet by mouth 2 times daily for 6 days  Qty: 12 tablet, Refills: 0    Associated Diagnoses: Pneumonia of right lower lobe due to infectious organism (H)           Allergies   No Known Allergies  Data   Most Recent 3 CBC's:  Recent Labs   Lab Test  01/18/18   0816  01/17/18   0819 01/16/18 2320 09/20/17   1140   WBC  12.4*   --   17.1*  6.9   HGB  13.4   --   15.5  15.6   MCV  93   --   92  95   PLT  190  205  232  239      Most Recent 3 BMP's:  Recent Labs   Lab Test  01/17/18   1430  01/17/18   0819 01/16/18 2320 09/20/17   1140  09/16/11   1009   NA   --    --   137  140  140   POTASSIUM  4.2   --   3.3*  4.5  4.6   CHLORIDE   --    --   103  105  102   CO2   --    --   25  26  29   BUN   --    --   20  16  13   CR   --   1.00  1.10  1.04  0.76   ANIONGAP   --    --   9  9  10   KAYLI   --    --   9.0  9.5  9.4   GLC   --    --   109*  91  100*     Most Recent 2 LFT's:  Recent Labs   Lab Test  01/16/18 2320 09/20/17   1140   AST  99*  16   ALT  84*  29   ALKPHOS  113  99   BILITOTAL  1.9*  1.6*     Most Recent INR's and Anticoagulation Dosing History:  Anticoagulation Dose History     There is no flowsheet data to display.        Most Recent 3 Troponin's:No lab results found.  Most Recent Cholesterol Panel:  Recent Labs   Lab Test  09/20/17   1140  09/16/11   1009   CHOL   --   188   LDL  87  114   HDL   --   63   TRIG   --   57     Most Recent 6 Bacteria Isolates From Any Culture (See EPIC  Reports for Culture Details):  Recent Labs   Lab Test  01/17/18   0035  01/17/18   0020   CULT  No growth after 1 day  No growth after 1 day     Most Recent TSH, T4 and A1c Labs:  Recent Labs   Lab Test  09/16/11   1009   TSH  1.36   A1C  5.4     Results for orders placed or performed during the hospital encounter of 01/16/18   CT Head w/o Contrast    Narrative    CT SCAN OF THE HEAD WITHOUT CONTRAST  1/17/2018 12:23 AM     HISTORY: AMS, dementia, generalized weakness.     TECHNIQUE: Axial images of the head and coronal reformations without  IV contrast material. Radiation dose for this scan was reduced using  automated exposure control, adjustment of the mA and/or kV according  to patient size, or iterative reconstruction technique.    COMPARISON: None.    FINDINGS: There is generalized atrophy of the brain. There is low  attenuation in the white matter of the cerebral hemispheres consistent  with sequelae of small vessel ischemic disease. There is no evidence  of intracranial hemorrhage, mass, acute infarct or anomaly.     The visualized portions of the sinuses and mastoids appear normal.  There is no evidence of trauma.      Impression    IMPRESSION: No acute abnormality.    AUSTIN HANNON MD   XR Chest 2 Views    Narrative    XR CHEST 2 VW  1/17/2018 12:04 AM      HISTORY: Suspect pneumonia.    COMPARISON: None.    FINDINGS: The heart size is normal. The thoracic aorta is calcified  and mildly tortuous. There is a large right lower lobe infiltrate. The  left lung is clear. No pneumothorax or pleural effusion.      Impression    IMPRESSION: Right lower lobe pneumonia.    AUSTIN HANNON MD   CT Abdomen Pelvis w Contrast    Narrative    CT ABDOMEN PELVIS W CONTRAST  1/17/2018 12:25 AM      HISTORY: Abdominal pain, nausea, and vomiting.    TECHNIQUE: CT abdomen and pelvis with intravenous contrast. Radiation  dose for this scan was reduced using automated exposure control,  adjustment of the mA and/or kV according  to patient size, or iterative  reconstruction technique. 86 mL Isovue-370.      COMPARISON: None.    FINDINGS:  Abdomen: There is a right lower lobe infiltrate consistent with  pneumonia. Dependent atelectasis at the lung bases. There are coronary  artery atherosclerotic calcifications. The heart size is normal. The  right hemidiaphragm is elevated. The liver and spleen are normal in  appearance. There are multiple stones in the gallbladder. The pancreas  and adrenal glands are normal in appearance. There is mild dilatation  of the renal collecting systems bilaterally. The kidneys otherwise  appear normal. There is no abdominal or pelvic lymph node enlargement.  There is atherosclerotic calcification of the aorta and its branches.  No aneurysm.    Pelvis: The prostate gland is markedly enlarged. There is mild urinary  bladder wall thickening. The prostate gland enlargement and wall  thickening may be the cause of mild bilateral hydronephrosis. There  are colonic diverticula without acute diverticulitis. No bowel  obstruction or inflammation. Large amount of stool in the rectum. No  free intraperitoneal gas or fluid. There is degenerative disease  throughout the spine.      Impression    IMPRESSION:  1. Right lower lobe pneumonia.  2. Cholelithiasis.  3. Marked prostate gland enlargement.  4. Mild bilateral hydronephrosis which may be due to the enlarged  prostate gland and mild urinary bladder wall thickening.  5. Colonic diverticula without acute diverticulitis.  6. Large amount of stool in the rectum.    AUSTIN HANNON MD

## 2018-01-18 NOTE — PLAN OF CARE
Problem: Patient Care Overview  Goal: Plan of Care/Patient Progress Review  Outcome: No Change  A&Ox1, oriented to self only. VSS on RA. Up with 1. Sitter at bedside d/t ocassional confusion. K recheck 4.2. Denies pain. Dressing CDI. LS diminished. No IV access. Possible D/C 1/18.

## 2018-01-23 LAB
BACTERIA SPEC CULT: NO GROWTH
BACTERIA SPEC CULT: NO GROWTH
Lab: NORMAL
Lab: NORMAL
SPECIMEN SOURCE: NORMAL
SPECIMEN SOURCE: NORMAL

## 2018-12-20 ENCOUNTER — HOSPITAL ENCOUNTER (INPATIENT)
Facility: CLINIC | Age: 83
LOS: 3 days | Discharge: HOSPICE | DRG: 871 | End: 2018-12-24
Attending: EMERGENCY MEDICINE | Admitting: INTERNAL MEDICINE
Payer: MEDICARE

## 2018-12-20 ENCOUNTER — APPOINTMENT (OUTPATIENT)
Dept: GENERAL RADIOLOGY | Facility: CLINIC | Age: 83
DRG: 871 | End: 2018-12-20
Attending: EMERGENCY MEDICINE
Payer: MEDICARE

## 2018-12-20 DIAGNOSIS — Z51.5 END OF LIFE CARE: Primary | ICD-10-CM

## 2018-12-20 DIAGNOSIS — A41.9 SEPTIC SHOCK (H): ICD-10-CM

## 2018-12-20 DIAGNOSIS — R65.21 SEPTIC SHOCK (H): ICD-10-CM

## 2018-12-20 LAB
ANION GAP SERPL CALCULATED.3IONS-SCNC: 10 MMOL/L (ref 3–14)
BASOPHILS # BLD AUTO: 0 10E9/L (ref 0–0.2)
BASOPHILS NFR BLD AUTO: 0.1 %
BUN SERPL-MCNC: 29 MG/DL (ref 7–30)
CALCIUM SERPL-MCNC: 8.9 MG/DL (ref 8.5–10.1)
CHLORIDE SERPL-SCNC: 110 MMOL/L (ref 94–109)
CO2 SERPL-SCNC: 25 MMOL/L (ref 20–32)
CREAT SERPL-MCNC: 1.15 MG/DL (ref 0.66–1.25)
DIFFERENTIAL METHOD BLD: ABNORMAL
EOSINOPHIL # BLD AUTO: 0 10E9/L (ref 0–0.7)
EOSINOPHIL NFR BLD AUTO: 0 %
ERYTHROCYTE [DISTWIDTH] IN BLOOD BY AUTOMATED COUNT: 13.9 % (ref 10–15)
GFR SERPL CREATININE-BSD FRML MDRD: 57 ML/MIN/{1.73_M2}
GLUCOSE SERPL-MCNC: 111 MG/DL (ref 70–99)
HCT VFR BLD AUTO: 36.2 % (ref 40–53)
HGB BLD-MCNC: 12.6 G/DL (ref 13.3–17.7)
IMM GRANULOCYTES # BLD: 0.1 10E9/L (ref 0–0.4)
IMM GRANULOCYTES NFR BLD: 0.5 %
LACTATE BLD-SCNC: 2.8 MMOL/L (ref 0.7–2)
LYMPHOCYTES # BLD AUTO: 0.2 10E9/L (ref 0.8–5.3)
LYMPHOCYTES NFR BLD AUTO: 1.5 %
MCH RBC QN AUTO: 32.9 PG (ref 26.5–33)
MCHC RBC AUTO-ENTMCNC: 34.8 G/DL (ref 31.5–36.5)
MCV RBC AUTO: 95 FL (ref 78–100)
MONOCYTES # BLD AUTO: 0.4 10E9/L (ref 0–1.3)
MONOCYTES NFR BLD AUTO: 2.9 %
NEUTROPHILS # BLD AUTO: 14.4 10E9/L (ref 1.6–8.3)
NEUTROPHILS NFR BLD AUTO: 95 %
NRBC # BLD AUTO: 0 10*3/UL
NRBC BLD AUTO-RTO: 0 /100
PLATELET # BLD AUTO: 170 10E9/L (ref 150–450)
POTASSIUM SERPL-SCNC: 3.5 MMOL/L (ref 3.4–5.3)
RBC # BLD AUTO: 3.83 10E12/L (ref 4.4–5.9)
SODIUM SERPL-SCNC: 145 MMOL/L (ref 133–144)
WBC # BLD AUTO: 15.2 10E9/L (ref 4–11)

## 2018-12-20 PROCEDURE — 87077 CULTURE AEROBIC IDENTIFY: CPT | Performed by: EMERGENCY MEDICINE

## 2018-12-20 PROCEDURE — 02HV33Z INSERTION OF INFUSION DEVICE INTO SUPERIOR VENA CAVA, PERCUTANEOUS APPROACH: ICD-10-PCS | Performed by: EMERGENCY MEDICINE

## 2018-12-20 PROCEDURE — 85025 COMPLETE CBC W/AUTO DIFF WBC: CPT | Performed by: EMERGENCY MEDICINE

## 2018-12-20 PROCEDURE — 87040 BLOOD CULTURE FOR BACTERIA: CPT | Performed by: EMERGENCY MEDICINE

## 2018-12-20 PROCEDURE — 71046 X-RAY EXAM CHEST 2 VIEWS: CPT

## 2018-12-20 PROCEDURE — 3E043XZ INTRODUCTION OF VASOPRESSOR INTO CENTRAL VEIN, PERCUTANEOUS APPROACH: ICD-10-PCS | Performed by: EMERGENCY MEDICINE

## 2018-12-20 PROCEDURE — 36556 INSERT NON-TUNNEL CV CATH: CPT

## 2018-12-20 PROCEDURE — 87186 SC STD MICRODIL/AGAR DIL: CPT | Performed by: EMERGENCY MEDICINE

## 2018-12-20 PROCEDURE — 99291 CRITICAL CARE FIRST HOUR: CPT | Mod: 25

## 2018-12-20 PROCEDURE — 25000128 H RX IP 250 OP 636: Performed by: EMERGENCY MEDICINE

## 2018-12-20 PROCEDURE — 80048 BASIC METABOLIC PNL TOTAL CA: CPT | Performed by: EMERGENCY MEDICINE

## 2018-12-20 PROCEDURE — 27210179 ZZH KIT MONITOR CVP

## 2018-12-20 PROCEDURE — 87800 DETECT AGNT MULT DNA DIREC: CPT | Performed by: EMERGENCY MEDICINE

## 2018-12-20 PROCEDURE — 36415 COLL VENOUS BLD VENIPUNCTURE: CPT | Performed by: EMERGENCY MEDICINE

## 2018-12-20 PROCEDURE — 83605 ASSAY OF LACTIC ACID: CPT | Performed by: EMERGENCY MEDICINE

## 2018-12-20 PROCEDURE — 99292 CRITICAL CARE ADDL 30 MIN: CPT

## 2018-12-20 RX ORDER — NOREPINEPHRINE BITARTRATE/D5W 16MG/250ML
0.03-0.4 PLASTIC BAG, INJECTION (ML) INTRAVENOUS CONTINUOUS
Status: DISCONTINUED | OUTPATIENT
Start: 2018-12-20 | End: 2018-12-22

## 2018-12-20 RX ORDER — PIPERACILLIN SODIUM, TAZOBACTAM SODIUM 3; .375 G/15ML; G/15ML
3.38 INJECTION, POWDER, LYOPHILIZED, FOR SOLUTION INTRAVENOUS ONCE
Status: DISCONTINUED | OUTPATIENT
Start: 2018-12-20 | End: 2018-12-21

## 2018-12-20 RX ORDER — ONDANSETRON 4 MG/1
4 TABLET, ORALLY DISINTEGRATING ORAL EVERY 6 HOURS PRN
COMMUNITY

## 2018-12-20 RX ORDER — SODIUM CHLORIDE 9 MG/ML
INJECTION, SOLUTION INTRAVENOUS CONTINUOUS
Status: DISCONTINUED | OUTPATIENT
Start: 2018-12-20 | End: 2018-12-21

## 2018-12-20 RX ORDER — DIVALPROEX SODIUM 250 MG/1
250 TABLET, DELAYED RELEASE ORAL 2 TIMES DAILY
COMMUNITY

## 2018-12-20 RX ORDER — SODIUM CHLORIDE 9 MG/ML
1000 INJECTION, SOLUTION INTRAVENOUS CONTINUOUS
Status: DISCONTINUED | OUTPATIENT
Start: 2018-12-20 | End: 2018-12-21

## 2018-12-20 RX ORDER — ACETAMINOPHEN 500 MG
500 TABLET ORAL EVERY 6 HOURS PRN
COMMUNITY

## 2018-12-20 RX ADMIN — SODIUM CHLORIDE 1000 ML: 9 INJECTION, SOLUTION INTRAVENOUS at 22:54

## 2018-12-20 RX ADMIN — SODIUM CHLORIDE 1000 ML: 9 INJECTION, SOLUTION INTRAVENOUS at 02:00

## 2018-12-20 SDOH — HEALTH STABILITY: MENTAL HEALTH: HOW OFTEN DO YOU HAVE A DRINK CONTAINING ALCOHOL?: NEVER

## 2018-12-20 ASSESSMENT — MIFFLIN-ST. JEOR: SCORE: 1672.82

## 2018-12-20 ASSESSMENT — ENCOUNTER SYMPTOMS
SHORTNESS OF BREATH: 0
DIARRHEA: 1
VOMITING: 1
NAUSEA: 1
FEVER: 1

## 2018-12-21 ENCOUNTER — APPOINTMENT (OUTPATIENT)
Dept: GENERAL RADIOLOGY | Facility: CLINIC | Age: 83
DRG: 871 | End: 2018-12-21
Attending: EMERGENCY MEDICINE
Payer: MEDICARE

## 2018-12-21 ENCOUNTER — APPOINTMENT (OUTPATIENT)
Dept: CT IMAGING | Facility: CLINIC | Age: 83
DRG: 871 | End: 2018-12-21
Attending: EMERGENCY MEDICINE
Payer: MEDICARE

## 2018-12-21 ENCOUNTER — APPOINTMENT (OUTPATIENT)
Dept: SPEECH THERAPY | Facility: CLINIC | Age: 83
DRG: 871 | End: 2018-12-21
Attending: ANESTHESIOLOGY
Payer: MEDICARE

## 2018-12-21 PROBLEM — N39.0 SEPSIS DUE TO URINARY TRACT INFECTION (H): Status: ACTIVE | Noted: 2018-12-21

## 2018-12-21 PROBLEM — A41.9 SEPSIS DUE TO URINARY TRACT INFECTION (H): Status: ACTIVE | Noted: 2018-12-21

## 2018-12-21 LAB
ALBUMIN SERPL-MCNC: 2.4 G/DL (ref 3.4–5)
ALBUMIN UR-MCNC: 30 MG/DL
ALP SERPL-CCNC: 65 U/L (ref 40–150)
ALT SERPL W P-5'-P-CCNC: 78 U/L (ref 0–70)
ANION GAP SERPL CALCULATED.3IONS-SCNC: 10 MMOL/L (ref 3–14)
APPEARANCE UR: ABNORMAL
AST SERPL W P-5'-P-CCNC: 68 U/L (ref 0–45)
BACTERIA #/AREA URNS HPF: ABNORMAL /HPF
BILIRUB SERPL-MCNC: 1.4 MG/DL (ref 0.2–1.3)
BILIRUB UR QL STRIP: NEGATIVE
BUN SERPL-MCNC: 26 MG/DL (ref 7–30)
CALCIUM SERPL-MCNC: 7.5 MG/DL (ref 8.5–10.1)
CHLORIDE SERPL-SCNC: 115 MMOL/L (ref 94–109)
CO2 SERPL-SCNC: 19 MMOL/L (ref 20–32)
COLOR UR AUTO: YELLOW
CORTIS SERPL-MCNC: 54.5 UG/DL (ref 4–22)
CREAT SERPL-MCNC: 1.06 MG/DL (ref 0.66–1.25)
ERYTHROCYTE [DISTWIDTH] IN BLOOD BY AUTOMATED COUNT: 14.4 % (ref 10–15)
ERYTHROCYTE [DISTWIDTH] IN BLOOD BY AUTOMATED COUNT: 14.5 % (ref 10–15)
GFR SERPL CREATININE-BSD FRML MDRD: 63 ML/MIN/{1.73_M2}
GLUCOSE BLDC GLUCOMTR-MCNC: 116 MG/DL (ref 70–99)
GLUCOSE BLDC GLUCOMTR-MCNC: 86 MG/DL (ref 70–99)
GLUCOSE BLDC GLUCOMTR-MCNC: 96 MG/DL (ref 70–99)
GLUCOSE SERPL-MCNC: 125 MG/DL (ref 70–99)
GLUCOSE UR STRIP-MCNC: NEGATIVE MG/DL
HCT VFR BLD AUTO: 29.7 % (ref 40–53)
HCT VFR BLD AUTO: 32.8 % (ref 40–53)
HGB BLD-MCNC: 10.1 G/DL (ref 13.3–17.7)
HGB BLD-MCNC: 11.1 G/DL (ref 13.3–17.7)
HGB UR QL STRIP: ABNORMAL
INR PPP: 1.59 (ref 0.86–1.14)
KETONES UR STRIP-MCNC: 5 MG/DL
LACTATE BLD-SCNC: 3.6 MMOL/L (ref 0.7–2)
LACTATE SERPL-SCNC: 4.1 MMOL/L (ref 0.4–2)
LEUKOCYTE ESTERASE UR QL STRIP: ABNORMAL
MAGNESIUM SERPL-MCNC: 1.9 MG/DL (ref 1.6–2.3)
MCH RBC QN AUTO: 32.6 PG (ref 26.5–33)
MCH RBC QN AUTO: 32.7 PG (ref 26.5–33)
MCHC RBC AUTO-ENTMCNC: 33.8 G/DL (ref 31.5–36.5)
MCHC RBC AUTO-ENTMCNC: 34 G/DL (ref 31.5–36.5)
MCV RBC AUTO: 96 FL (ref 78–100)
MCV RBC AUTO: 96 FL (ref 78–100)
MRSA DNA SPEC QL NAA+PROBE: NEGATIVE
MUCOUS THREADS #/AREA URNS LPF: PRESENT /LPF
NITRATE UR QL: POSITIVE
PH UR STRIP: 5.5 PH (ref 5–7)
PHOSPHATE SERPL-MCNC: 2.5 MG/DL (ref 2.5–4.5)
PLATELET # BLD AUTO: 163 10E9/L (ref 150–450)
PLATELET # BLD AUTO: 171 10E9/L (ref 150–450)
POTASSIUM SERPL-SCNC: 3.8 MMOL/L (ref 3.4–5.3)
PROT SERPL-MCNC: 5.4 G/DL (ref 6.8–8.8)
RBC # BLD AUTO: 3.09 10E12/L (ref 4.4–5.9)
RBC # BLD AUTO: 3.41 10E12/L (ref 4.4–5.9)
RBC #/AREA URNS AUTO: 112 /HPF (ref 0–2)
SODIUM SERPL-SCNC: 144 MMOL/L (ref 133–144)
SOURCE: ABNORMAL
SP GR UR STRIP: 1.02 (ref 1–1.03)
SPECIMEN SOURCE: NORMAL
UROBILINOGEN UR STRIP-MCNC: NORMAL MG/DL (ref 0–2)
WBC # BLD AUTO: 28.4 10E9/L (ref 4–11)
WBC # BLD AUTO: 32.7 10E9/L (ref 4–11)
WBC #/AREA URNS AUTO: >182 /HPF (ref 0–5)
WBC CLUMPS #/AREA URNS HPF: PRESENT /HPF

## 2018-12-21 PROCEDURE — 25000128 H RX IP 250 OP 636: Performed by: EMERGENCY MEDICINE

## 2018-12-21 PROCEDURE — 25000128 H RX IP 250 OP 636

## 2018-12-21 PROCEDURE — 87086 URINE CULTURE/COLONY COUNT: CPT | Performed by: EMERGENCY MEDICINE

## 2018-12-21 PROCEDURE — 00000146 ZZHCL STATISTIC GLUCOSE BY METER IP

## 2018-12-21 PROCEDURE — 25000125 ZZHC RX 250: Performed by: INTERNAL MEDICINE

## 2018-12-21 PROCEDURE — 92610 EVALUATE SWALLOWING FUNCTION: CPT | Mod: GN | Performed by: SPEECH-LANGUAGE PATHOLOGIST

## 2018-12-21 PROCEDURE — 96365 THER/PROPH/DIAG IV INF INIT: CPT | Mod: 59

## 2018-12-21 PROCEDURE — 40000986 XR CHEST PORT 1 VW

## 2018-12-21 PROCEDURE — 87186 SC STD MICRODIL/AGAR DIL: CPT | Performed by: EMERGENCY MEDICINE

## 2018-12-21 PROCEDURE — 82533 TOTAL CORTISOL: CPT | Performed by: INTERNAL MEDICINE

## 2018-12-21 PROCEDURE — 87088 URINE BACTERIA CULTURE: CPT | Performed by: EMERGENCY MEDICINE

## 2018-12-21 PROCEDURE — 99232 SBSQ HOSP IP/OBS MODERATE 35: CPT | Performed by: UROLOGY

## 2018-12-21 PROCEDURE — 80053 COMPREHEN METABOLIC PANEL: CPT | Performed by: INTERNAL MEDICINE

## 2018-12-21 PROCEDURE — 20000003 ZZH R&B ICU

## 2018-12-21 PROCEDURE — 84100 ASSAY OF PHOSPHORUS: CPT | Performed by: INTERNAL MEDICINE

## 2018-12-21 PROCEDURE — 83605 ASSAY OF LACTIC ACID: CPT | Performed by: INTERNAL MEDICINE

## 2018-12-21 PROCEDURE — 74177 CT ABD & PELVIS W/CONTRAST: CPT

## 2018-12-21 PROCEDURE — 85610 PROTHROMBIN TIME: CPT | Performed by: INTERNAL MEDICINE

## 2018-12-21 PROCEDURE — 99291 CRITICAL CARE FIRST HOUR: CPT | Performed by: INTERNAL MEDICINE

## 2018-12-21 PROCEDURE — 40000916 ZZH STATISTIC SITTER, NIGHT HOURS

## 2018-12-21 PROCEDURE — 87640 STAPH A DNA AMP PROBE: CPT | Performed by: INTERNAL MEDICINE

## 2018-12-21 PROCEDURE — 25000128 H RX IP 250 OP 636: Performed by: INTERNAL MEDICINE

## 2018-12-21 PROCEDURE — 25000128 H RX IP 250 OP 636: Performed by: ANESTHESIOLOGY

## 2018-12-21 PROCEDURE — 81001 URINALYSIS AUTO W/SCOPE: CPT | Performed by: EMERGENCY MEDICINE

## 2018-12-21 PROCEDURE — 40000225 ZZH STATISTIC SLP WARD VISIT: Performed by: SPEECH-LANGUAGE PATHOLOGIST

## 2018-12-21 PROCEDURE — 96366 THER/PROPH/DIAG IV INF ADDON: CPT

## 2018-12-21 PROCEDURE — 92526 ORAL FUNCTION THERAPY: CPT | Mod: GN | Performed by: SPEECH-LANGUAGE PATHOLOGIST

## 2018-12-21 PROCEDURE — 40000915 ZZH STATISTIC SITTER, EVENING HOURS

## 2018-12-21 PROCEDURE — 83735 ASSAY OF MAGNESIUM: CPT | Performed by: INTERNAL MEDICINE

## 2018-12-21 PROCEDURE — 85027 COMPLETE CBC AUTOMATED: CPT | Performed by: INTERNAL MEDICINE

## 2018-12-21 PROCEDURE — 87641 MR-STAPH DNA AMP PROBE: CPT | Performed by: INTERNAL MEDICINE

## 2018-12-21 PROCEDURE — 83605 ASSAY OF LACTIC ACID: CPT | Performed by: EMERGENCY MEDICINE

## 2018-12-21 PROCEDURE — 25000125 ZZHC RX 250: Performed by: EMERGENCY MEDICINE

## 2018-12-21 PROCEDURE — 96368 THER/DIAG CONCURRENT INF: CPT

## 2018-12-21 RX ORDER — NOREPINEPHRINE BITARTRATE/D5W 16MG/250ML
.03-.4 PLASTIC BAG, INJECTION (ML) INTRAVENOUS CONTINUOUS
Status: DISCONTINUED | OUTPATIENT
Start: 2018-12-21 | End: 2018-12-21

## 2018-12-21 RX ORDER — FUROSEMIDE 10 MG/ML
20 INJECTION INTRAMUSCULAR; INTRAVENOUS ONCE
Status: COMPLETED | OUTPATIENT
Start: 2018-12-22 | End: 2018-12-22

## 2018-12-21 RX ORDER — VALPROIC ACID 250 MG/1
250 CAPSULE, LIQUID FILLED ORAL EVERY 12 HOURS SCHEDULED
Status: DISCONTINUED | OUTPATIENT
Start: 2018-12-21 | End: 2018-12-21

## 2018-12-21 RX ORDER — ONDANSETRON 4 MG/1
4 TABLET, ORALLY DISINTEGRATING ORAL EVERY 6 HOURS PRN
Status: DISCONTINUED | OUTPATIENT
Start: 2018-12-21 | End: 2018-12-22

## 2018-12-21 RX ORDER — NALOXONE HYDROCHLORIDE 0.4 MG/ML
.1-.4 INJECTION, SOLUTION INTRAMUSCULAR; INTRAVENOUS; SUBCUTANEOUS
Status: DISCONTINUED | OUTPATIENT
Start: 2018-12-21 | End: 2018-12-22

## 2018-12-21 RX ORDER — SODIUM CHLORIDE, SODIUM LACTATE, POTASSIUM CHLORIDE, CALCIUM CHLORIDE 600; 310; 30; 20 MG/100ML; MG/100ML; MG/100ML; MG/100ML
INJECTION, SOLUTION INTRAVENOUS CONTINUOUS
Status: DISCONTINUED | OUTPATIENT
Start: 2018-12-21 | End: 2018-12-22

## 2018-12-21 RX ORDER — PIPERACILLIN SODIUM, TAZOBACTAM SODIUM 3; .375 G/15ML; G/15ML
3.38 INJECTION, POWDER, LYOPHILIZED, FOR SOLUTION INTRAVENOUS EVERY 6 HOURS
Status: DISCONTINUED | OUTPATIENT
Start: 2018-12-21 | End: 2018-12-22

## 2018-12-21 RX ORDER — FUROSEMIDE 10 MG/ML
INJECTION INTRAMUSCULAR; INTRAVENOUS
Status: DISCONTINUED
Start: 2018-12-21 | End: 2018-12-21 | Stop reason: WASHOUT

## 2018-12-21 RX ORDER — QUETIAPINE FUMARATE 25 MG/1
25 TABLET, FILM COATED ORAL 2 TIMES DAILY PRN
COMMUNITY

## 2018-12-21 RX ORDER — SODIUM CHLORIDE 9 MG/ML
INJECTION, SOLUTION INTRAVENOUS CONTINUOUS
Status: DISCONTINUED | OUTPATIENT
Start: 2018-12-21 | End: 2018-12-22

## 2018-12-21 RX ORDER — QUETIAPINE FUMARATE 25 MG/1
25 TABLET, FILM COATED ORAL EVERY MORNING
COMMUNITY

## 2018-12-21 RX ORDER — IOPAMIDOL 755 MG/ML
101 INJECTION, SOLUTION INTRAVASCULAR ONCE
Status: COMPLETED | OUTPATIENT
Start: 2018-12-21 | End: 2018-12-21

## 2018-12-21 RX ORDER — ONDANSETRON 2 MG/ML
4 INJECTION INTRAMUSCULAR; INTRAVENOUS EVERY 6 HOURS PRN
Status: DISCONTINUED | OUTPATIENT
Start: 2018-12-21 | End: 2018-12-22

## 2018-12-21 RX ADMIN — DEXMEDETOMIDINE HYDROCHLORIDE 0.2 MCG/KG/HR: 100 INJECTION, SOLUTION INTRAVENOUS at 21:51

## 2018-12-21 RX ADMIN — SODIUM CHLORIDE: 9 INJECTION, SOLUTION INTRAVENOUS at 06:26

## 2018-12-21 RX ADMIN — PIPERACILLIN SODIUM,TAZOBACTAM SODIUM 3.38 G: 3; .375 INJECTION, POWDER, FOR SOLUTION INTRAVENOUS at 03:43

## 2018-12-21 RX ADMIN — PIPERACILLIN SODIUM,TAZOBACTAM SODIUM 3.38 G: 3; .375 INJECTION, POWDER, FOR SOLUTION INTRAVENOUS at 16:45

## 2018-12-21 RX ADMIN — VALPROATE SODIUM 130 MG: 100 INJECTION, SOLUTION INTRAVENOUS at 20:54

## 2018-12-21 RX ADMIN — PIPERACILLIN SODIUM,TAZOBACTAM SODIUM 3.38 G: 3; .375 INJECTION, POWDER, FOR SOLUTION INTRAVENOUS at 09:39

## 2018-12-21 RX ADMIN — VASOPRESSIN 2.4 UNITS/HR: 20 INJECTION INTRAVENOUS at 00:55

## 2018-12-21 RX ADMIN — ENOXAPARIN SODIUM 40 MG: 40 INJECTION SUBCUTANEOUS at 09:39

## 2018-12-21 RX ADMIN — HYDROCORTISONE SODIUM SUCCINATE 50 MG: 100 INJECTION, POWDER, FOR SOLUTION INTRAMUSCULAR; INTRAVENOUS at 03:43

## 2018-12-21 RX ADMIN — PIPERACILLIN SODIUM,TAZOBACTAM SODIUM 3.38 G: 3; .375 INJECTION, POWDER, FOR SOLUTION INTRAVENOUS at 23:01

## 2018-12-21 RX ADMIN — SODIUM CHLORIDE, POTASSIUM CHLORIDE, SODIUM LACTATE AND CALCIUM CHLORIDE 500 ML: 600; 310; 30; 20 INJECTION, SOLUTION INTRAVENOUS at 03:53

## 2018-12-21 RX ADMIN — SODIUM CHLORIDE, POTASSIUM CHLORIDE, SODIUM LACTATE AND CALCIUM CHLORIDE 500 ML: 600; 310; 30; 20 INJECTION, SOLUTION INTRAVENOUS at 09:41

## 2018-12-21 RX ADMIN — SODIUM CHLORIDE, POTASSIUM CHLORIDE, SODIUM LACTATE AND CALCIUM CHLORIDE: 600; 310; 30; 20 INJECTION, SOLUTION INTRAVENOUS at 06:26

## 2018-12-21 RX ADMIN — VANCOMYCIN HYDROCHLORIDE 1500 MG: 5 INJECTION, POWDER, LYOPHILIZED, FOR SOLUTION INTRAVENOUS at 11:15

## 2018-12-21 RX ADMIN — IOPAMIDOL 101 ML: 755 INJECTION, SOLUTION INTRAVENOUS at 01:36

## 2018-12-21 RX ADMIN — NOREPINEPHRINE BITARTRATE 0.03 MCG/KG/MIN: 1 INJECTION INTRAVENOUS at 00:15

## 2018-12-21 RX ADMIN — HYDROCORTISONE SODIUM SUCCINATE 50 MG: 100 INJECTION, POWDER, FOR SOLUTION INTRAMUSCULAR; INTRAVENOUS at 09:39

## 2018-12-21 RX ADMIN — HYDROCORTISONE SODIUM SUCCINATE 50 MG: 100 INJECTION, POWDER, FOR SOLUTION INTRAMUSCULAR; INTRAVENOUS at 14:25

## 2018-12-21 RX ADMIN — SODIUM CHLORIDE, POTASSIUM CHLORIDE, SODIUM LACTATE AND CALCIUM CHLORIDE 500 ML: 600; 310; 30; 20 INJECTION, SOLUTION INTRAVENOUS at 08:31

## 2018-12-21 RX ADMIN — HYDROCORTISONE SODIUM SUCCINATE 50 MG: 100 INJECTION, POWDER, FOR SOLUTION INTRAMUSCULAR; INTRAVENOUS at 20:54

## 2018-12-21 ASSESSMENT — ACTIVITIES OF DAILY LIVING (ADL)
WHICH_OF_THE_ABOVE_FUNCTIONAL_RISKS_HAD_A_RECENT_ONSET_OR_CHANGE?: SWALLOWING
SWALLOWING: 0-->SWALLOWS FOODS/LIQUIDS WITHOUT DIFFICULTY
ADLS_ACUITY_SCORE: 26
ADLS_ACUITY_SCORE: 27
COGNITION: 1 - ATTENTION OR MEMORY DEFICITS
ADLS_ACUITY_SCORE: 27
AMBULATION: 1-->ASSISTIVE EQUIPMENT
ADLS_ACUITY_SCORE: 27
ADLS_ACUITY_SCORE: 21

## 2018-12-21 ASSESSMENT — MIFFLIN-ST. JEOR: SCORE: 1538.63

## 2018-12-21 NOTE — ED NOTES
Bed: ED23  Expected date:   Expected time:   Means of arrival:   Comments:  Tonya 2  85M  N/V/Fever

## 2018-12-21 NOTE — ED PROVIDER NOTES
Received a call from radiology stating that the Aviles catheter balloon was inflated in the patient's prostate.  The balloon was deflated and Aviles catheter was advanced with return of bloody urine.  The balloon was reinflated and visualized to be in the bladder on bedside ultrasound.    Emory Lowery, Emory Pritchett,   12/21/18 0158

## 2018-12-21 NOTE — PLAN OF CARE
Pt alert, following commands. Disoriented to place, situation, and time. Restrained due to pulling lines and agitation. Reorientation and redirection works with pt. On levo and vaso gtt for MAP >60. Afebrile. VS stable. On NC. Clear, diminished lung sounds. UO is blood tinged. Irrigated catheter several times with minimal output. Will continue to monitor      [place] - Suicide Severity Rating Scale Completed? No, secondary to advanced dementia   If yes, what color did the patient score? N/A

## 2018-12-21 NOTE — PROVIDER NOTIFICATION
Notified Nhi Santiago NP w/ blood culture updates; positive for E. Coli in Rt arm culture from 12/20/18. Awaiting orders.

## 2018-12-21 NOTE — PHARMACY-ADMISSION MEDICATION HISTORY
Admission medication history interview status for the 12/20/2018  admission is complete. See EPIC admission navigator for prior to admission medications     Medication history source reliability:Good    Actions taken by pharmacist (provider contacted, etc):PTA med list from [facility] living medication sheets     Additional medication history information not noted on PTA med list :None    Medication reconciliation/reorder completed by provider prior to medication history? No    Time spent in this activity: 20 minutes    Prior to Admission medications    Medication Sig Last Dose Taking? Auth Provider   acetaminophen (TYLENOL) 500 MG tablet Take 500 mg by mouth every 6 hours as needed for fever   Yes Reported, Patient   cholecalciferol (VITAMIN D3) 5000 units TABS tablet Take 5,000 Units by mouth daily  Yes Reported, Patient   divalproex sodium delayed-release (DEPAKOTE) 250 MG DR tablet Take 250 mg by mouth 2 times daily  Yes Reported, Patient   ondansetron (ZOFRAN-ODT) 4 MG ODT tab Take 4 mg by mouth every 6 hours as needed for nausea  prn Yes Reported, Patient   QUEtiapine (SEROQUEL) 25 MG tablet Take 25 mg by mouth every morning  Yes Unknown, Entered By History   QUEtiapine (SEROQUEL) 25 MG tablet Take 25 mg by mouth 2 times daily as needed  Yes Unknown, Entered By History   Skin Protectants, Misc. (LANTISEPTIC SKIN PROTECTANT EX) Externally apply topically 2 times daily  Yes Unknown, Entered By History

## 2018-12-21 NOTE — ED PROVIDER NOTES
History     Chief Complaint:  Nausea/vomiting/diarrhea    HPI   Hx limited 2/2 advanced dementia  Chuy Barrera is a 86 year old male who presents with nausea, vomiting, diarrhea. The patient currently lives in a nursing home where he had a fever of 102 earlier today. Due to his age and having a fever, they have sent him to the ED for evaluation and treatment. EMS reports the patient to be hypotensive and hypoxic. Upon arrival, the patient is afebrile (the nursing home did give him tylenol). The patient denies any pain or shortness of breath. The nurse was able to contact the patients wife, Allison, who said we can do a workup. Of note the paitent has advanced alzheimer's disease.    Allergies:  The patient has no known drug allergies.    Medications:    tylenol  depakote  zofran  seroquel    Past Medical History:    late onset alzheimer's disease  Malignant melanoma  Dizziness and giddiness  Actinic keratosis   Sebaceous cyst  Elevated homocystine  Pneumonia    Past Surgical History:    wide excision of chest  Bx chest lesion  Hernia repair    Family History:    Coronary Artery Disease    Social History:  Marital Status:     Smoker:   Never   Smokeless:   No   Alcohol:   Never   Drugs:   No   Lives at:   Nursing home   Arrives with:   No one   Clinic:    Unknown   Work:   None     Review of Systems   Unable to perform ROS: Dementia   Constitutional: Positive for fever.   Respiratory: Negative for shortness of breath.    Gastrointestinal: Positive for diarrhea, nausea and vomiting.   All other systems reviewed and are negative.    Physical Exam     Patient Vitals for the past 24 hrs:   BP Temp Temp src Pulse Heart Rate Resp SpO2 Height Weight   12/21/18 0000 (!) 89/77 -- -- 112 109 (!) 32 91 % -- --   12/20/18 2350 91/54 -- -- -- 106 22 -- -- --   12/20/18 2148 (!) 79/59 -- -- 115 -- -- -- -- --   12/20/18 2114 -- -- -- -- -- -- 92 % -- --   12/20/18 2112 (!) 84/56 98.4  F (36.9  C) Oral -- 118 16 91 % 1.905  "m (6' 3\") 90.7 kg (200 lb)     Physical Exam  General/Appearance: appears stated age, well-groomed, appears comfortable but non-verbal, limited ability to follow commands  Eyes: EOMI, no scleral injection, no icterus  ENT: MMM, edentulous  Neck: supple, nl ROM, no stiffness  Cardiovascular: tachy but regular, nl S1S2, no m/r/g, 2+ pulses in all 4 extremities, cap refill <2sec  Respiratory: CTAB, good air movement throughout, no wheezes/rhonchi/rales, no increased WOB, no retractions  Back: no lesions  GI: abd soft, non-distended, no obvious ttp,  no HSM, no rebound, no guarding, nl BS  MSK: FRANCO, good tone, no bony abnormality  Skin: warm and well-perfused, no rash, no edema, no ecchymosis, nl turgor  Neuro: awake but significant dementia, no focal neuro deficits  Psych: deferred  Heme: no petechia, no purpura, no active bleeding    Emergency Department Course   Imaging:  Radiographic findings were communicated with the patient who voiced understanding of the findings.    XR Chest 2 views:   Basilar opacities likely represent atelectasis. Previously  demonstrated right lower lobe pneumonia has resolved. Heart size is  unchanged. As per radiology.     CT Abdomen/Pelvis with IV contrast:   Pending.    XR Chest 1 view portable:   New right IJ line with tip at the caval-atrial junction.  No pneumothorax. No other significant change. as per radiology.     Laboratory:  CBC: WBC: 15.2, HGB: 12.6, PLT: 170  BMP: Glucose 111, sodium 145, chloride 110, GFR estimate 57, o/w WNL (Creatinine: 1.15)  UA with Microscopic: ketones 5, blood large, protein albumin 30, nitrite Positive, leukocyte esterase large, wbc >182, , WBC clumps Present, Bacteria few, mucous present, o/w WNL  2130 Lactic acid: 2.8  0020 Lactic acid: 4.1  Blood cultures (X2): pending    Procedure:    Central Line Placement     Procedure:  Central Line Placement with Ultrasound Guidance.    Indications: Vascular access, Sepsis (monitoring of mixed venous " oxygen saturations) and Fluid administration    Consent:  Risks (including but not limited to: pneumothorax,bleeding, infection or artery puncture), benefits and alternatives were discussed with  unable to obtain due to emergency conditions and consent for procedure was obtained.    Timeout:  Universal protocol was followed. TIME OUT conducted just prior to starting procedure confirmed patient identity, site/side, procedure, patient position, and availability of correct equipment and implants.?  Yes    Procedure note:  Right Internal Jugular approach was selected and the right anterior neck was prepped, cleansed and draped in a sterile fashion.  Mask, gown and gloves were used per sterile protocol.  Patient was then placed into Trendelenburg position and lidocaine was used for local anesthesia.  Landmarks were identified and Vascular probe with ultrasound was used in a sterile fashion for guidence.  Introducer needle was then used to gain access to the central venous circulation.  Using Seldinger technique the  Triple lumen catheter was placed.  Catheter port(s) were aspirated and flushed.  Central line was sutured in place and sterile dressing applied.   Appropriate placement was confirmed by x-ray and no pneumothorax was visualized.    Patient Status:  Patient tolerated the procedure moderately well.  There were no complications.    Interventions:  2152: NS 1L IV Bolus   2252: Zosyn 3.375 g IV Infusion  2254: sodium chloride 0.9% 1000 mLs infusion IV  0015: Levophed 16 mg in 250 mL infusion    Emergency Department Course:  Nursing notes and vitals reviewed. (2133) I performed an exam of the patient as documented above.     IV inserted. Medicine administered as documented above. Blood drawn. This was sent to the lab for further testing, results above.    The patient was sent for a XR chest while in the emergency department, findings above.     (2225) I rechecked the patient. Second line started putting fluid on  pressure bags.  (2304) I attempted to reach the patients spouse for consent for a central line.  (2330) Central Line Placed 2335  (0030) Intensivist paged  (0046)  I consulted with Dr. Merchant of the hospitalist services. They are in agreement to accept the patient for admission.  (0110) Two pressors going. Pt headed to CT.    Findings and plan explained to the Patient and spouse and daughter who consents to admission. Discussed the patient with Dr. Merchant, who will admit the patient to a ICU bed for further monitoring, evaluation, and treatment.    Impression & Plan      CMS Diagnoses: The patient has signs of Septic Shock as evidenced by:    1. Presence of Sepsis, AND  2. Persistent hypotension defined by the last 2 BP readings within the ONE HOUR follwing completion of the 30mL/kg bolus being low (SBP <90 or MAP <65)    Time septic shock diagnosis confirmed = 2300 as this was the time when Persistent Hypotension present (2 consecutive SBP <90 or MAP <65 within ONE hour after 30cc/kg IVF bolus completed)      3 Hour Septic Shock Bundle Completion:  1. Initial Lactic Acid Result:   Recent Labs   Lab Test 12/20/18  2130 01/17/18  0100 01/16/18  2320   LACT 2.8* 3.1* 3.3*     2. Blood Cultures before Antibiotics: No, antibiotics were started prior to blood culture collection because waiting for the blood culture to be collected would have resulted in a delay of 45 minutes or more to the administration of antibiotics.  3. Broad Spectrum Antibiotics Administered: Yes     Anti-infectives (From now, onward)    Start     Dose/Rate Route Frequency Ordered Stop    12/21/18 0019  vancomycin (VANCOCIN) 2,000 mg in sodium chloride 0.9 % 500 mL intermittent infusion      2,000 mg  over 2 Hours Intravenous ONCE 12/21/18 0018      12/20/18 2224  piperacillin-tazobactam (ZOSYN) 3.375 g vial to attach to  mL bag      3.375 g  over 30 Minutes Intravenous ONCE 12/20/18 2223          4. 3000 ml of IV fluids.  Ideal body weight:  84.5 kg (186 lb 4.6 oz)  Adjusted ideal body weight: 87 kg (191 lb 12.4 oz)    Severe Sepsis reassessment:  1. Repeat Lactic Acid Level: 4.1  2. Vasopressors started for Persistent Hypotension defined by the last 2 BP readings within the ONE HOUR follwing completion of the 30mL/kg bolus being low (SBP <90 or MAP <65).  I attest to having performed a repeat sepsis exam and assessment of perfusion at 0045 and the results demonstrate worsening perfusion.              Medical Decision Making:  This patient is an 86-year-old male with advanced dementia who presents with vomiting, questionable diarrhea, and fever today.  Upon arrival to the ED he is slightly agitated and intermittently swinging at staff.  Upon arrival to the ED he was noted to be slightly hypoxic, hypotensive and tachycardic.  These parameters did not improve markedly after 2 L of fluid.  He was febrile earlier today and with that, his white count elevated, and lactate elevated he technically meets criteria for septic shock.  The decision was made to put in a central line.  I attempted twice to call his wife to get verbal consent however was unable to reach her.  I determined it was emergent and therefore placed to the right central line without complication.  He currently is on levophed vasopressin has been ordered.  He has received 3 L of fluid and is starting his infusion rate.  It appears that his source is likely a urinary source.  He received Zosyn early on and Vanco has been ordered as initially I was unclear of the source.  As he was having vomiting and questionable diarrhea I also considered abdominal source and ischemia so abdominal CT has been ordered.  The chest x-ray did not show any clear pneumonia.  He remains critical at this time, pressor dependent.  He will be coming into the ICU.  Critical Care time:  was 50 minutes for this patient excluding procedures.        Diagnosis:    ICD-10-CM    1. Septic shock (H) A41.9 UA with Microscopic     R65.21 Urine Culture       Disposition:  Admitted to ICU    Scribe Disclosure:  I, Moe Romero, am serving as a scribe on 12/20/2018 at 9:33 PM to personally document services performed by Dory Toth* based on my observations and the provider's statements to me.     Moe Romero  12/20/2018    EMERGENCY DEPARTMENT       Dory Toth MD  12/21/18 0112

## 2018-12-21 NOTE — PROGRESS NOTES
12/21/18 1317   General Information   Onset Date 12/20/18   Start of Care Date 12/21/18  (1300)   Referring Physician Jostin Chan MD   Patient Profile Review/OT: Additional Occupational Profile Info See Profile for full history and prior level of function   Patient/Family Goals Statement Not able to state.   Swallowing Evaluation Bedside swallow evaluation   Behaviorial Observations Alert;Confused;Initiation problems   Mode of current nutrition NPO   Respiratory Status O2 Supply   Type of O2 supply Nasal cannula   Comments Chuy Barrera is a 86 year old male who presents with urosepsis/septic shock. Patient resides in a NH and has advanced Alzheimer's dementia. CXR revealed bibasilar opacities likely atelectasis. Patient has hx of RLL pneumonia 1/2018. Wrist restraints in place for safety. Patient's spouse present and reported patient wears dentures and eats regular diet at baseline. RN reported swallowing concerns while performing oral cares using oral swab earlier today. Note congested cough at baseline today.   Clinical Swallow Evaluation   Oral Musculature anomalies present   Dentition upper and lower dentures  (not present in hospital)   Secretion Management problems swallowing secretions   Mucosal Quality dry   Mandibular Strength and Mobility intact   Oral Labial Strength and Mobility impaired coordination   Lingual Strength and Mobility impaired coordination   Velar Elevation intact   Laryngeal Function Cough;Throat clear;Swallow;Voicing initiated;Dry swallow palpated  (cough ineffective/weak )   Clinical Swallow Eval: Thin Liquid Texture Trial   Mode of Presentation, Thin Liquids spoon;fed by clinician   Volume of Liquid or Food Presented 2 small ice chips   Oral Phase of Swallow Poor AP movement   Pharyngeal Phase of Swallow impaired;reduction in laryngeal movement;wet vocal quality after swallow  (delayed or absent swallow response)   Diagnostic Statement High aspiration risk   Clinical  Swallow Eval: Nectar Thick Liquid Texture Trial   Mode of Presentation, Nectar spoon;fed by clinician   Volume of Nectar Presented 3 tiny sips by spoon   Oral Phase, [place] Poor AP movement   Pharyngeal Phase, Nectar impaired;reduction in laryngeal movement;wet vocal quality after swallow  (delayed or absent swallow response)   Diagnostic Statement High aspiration risk   General Therapy Interventions   Planned Therapy Interventions Dysphagia Treatment   Dysphagia treatment Modified diet education;Instruction of safe swallow strategies   Swallow Eval: Clinical Impressions   Skilled Criteria for Therapy Intervention Skilled criteria met.  Treatment indicated.   Functional Assessment Scale (FAS) 2   Treatment Diagnosis moderate-severe oropharyngeal dysphagia   Diet texture recommendations NPO   Therapy Frequency daily   Predicted Duration of Therapy Intervention (days/wks) 1 week   Anticipated Discharge Disposition (long term care)   Risks and Benefits of Treatment have been explained. Yes   Patient, family and/or staff in agreement with Plan of Care Yes   Clinical Impression Comments Patient presents with moderate-severe oropharyngeal dysphagia characterized by reduced oral bolus control and AP transit, significantly delayed or absent swallow response with limited PO trials of ice chips and nectar thick liquid by spoon. Note poor oral secretion management and congested cough and vocal quality at baseline and after PO trials. Patient appears at high risk for aspiration at this time. Recommend continue NPO with excellent oral cares. Will continue to assess for PO readiness daily. Discussed with RN and MD.   Total Evaluation Time   Total Evaluation Time (Minutes) 10

## 2018-12-21 NOTE — H&P
[facility]    History and Physical  [facility] Intensive Care     Date of Admission:  12/20/2018    Assessment & Plan   Chuy Barrera is a 86 year old male who presents with urosepsis/septic shock.    Neurology:  Dementia: baseline is severe dementia. No way to assess deviation from baseline.  -continue his daily valproic acid 250 BID    Cardiovascular:  Septic shock: due to UTI without obstruction  -support blood pressure with vasopressin and norepinephrine    Pulmonary:        No issues:  CXR clear  -    Renal  No issues: avoid nephrotoxic drugs as able    ID:         Urosepsis: cultures pending  -Zosyn and vancomycin now, refine when culture results available  -consider for Vit C study    GI  Mildly elevated transaminases and total bilirubin.    -follow daily trend. Normal alkaline phosphatase and no abnormalities on CT scan     Nutrition  No issues: advance diet as tolerated  -    Endocrine:  No issues: stress dose steroids for septic shock  -    Heme/Onc:  HIstory of melanoma: resected, no recurrence  -    DVT Prophylaxis: Enoxaparin (Lovenox) SQ  GI Prophylaxis: Not indicated    Restraints: Restraints for medical healing needed: YES    Family update by me today: No    Daniele Fisher    Time Spent on this Encounter   Billing:  I spent 45 minutes bedside and on the inpatient unit today managing the critical care of Chuy Barrera in relation to the issues listed in this note.    Code Status   DNR / DNI    Primary Care Physician   Piero Wallace    Chief Complaint   Fever, low blood pressure at nursing home    History is obtained from the electronic health record and emergency department physician    History of Present Illness   Chuy Barrera is a 86 year old male who presents with nausea, old male who presents with vomiting, diarrhea. The patient currently lives in a nursing home where he had a fever of 102 earlier today. Due to his age and having a fever, they have sent him to  the ED for evaluation and treatment. EMS reports the patient to be hypotensive and hypoxic. Upon arrival, the patient is afebrile (the nursing home did give him tylenol). The patient denies any pain or shortness of breath. The nurse was able to contact the patients wife, Allison, who said we can do a workup. Of note the patient has advanced alzheimer's disease. Workup in ED revealed a UTI with no obstruction or hydronephrosis on CT scan.        Past Medical History    I have reviewed this patient's medical history and updated it with pertinent information if needed.   Past Medical History:   Diagnosis Date     Late onset Alzheimer's disease with behavioral disturbance 9/20/2017     Personal history of malignant melanoma of skin        Past Surgical History   I have reviewed this patient's surgical history and updated it with pertinent information if needed.  Past Surgical History:   Procedure Laterality Date     SURGICAL HISTORY OF -   4/30/99    Wide excision chest lesion     SURGICAL HISTORY OF -   4/20/99    Bx chest lesion, Dx melanoma     SURGICAL HISTORY OF -       s/p Hernia repair     SURGICAL HISTORY OF -   12/5/00    Bx chest lesion scar, neg.       Prior to Admission Medications   Prior to Admission Medications   Prescriptions Last Dose Informant Patient Reported? Taking?   QUEtiapine (SEROQUEL) 25 MG tablet   No No   Sig: Take 1 tablet (25 mg) by mouth nightly as needed For agitation, insomnia   acetaminophen (TYLENOL) 500 MG tablet   Yes Yes   Sig: Take 500 mg by mouth every 6 hours as needed for mild pain   cholecalciferol (VITAMIN D3) 5000 units TABS tablet   Yes Yes   Sig: Take 5,000 Units by mouth daily   divalproex sodium delayed-release (DEPAKOTE) 250 MG DR tablet   Yes Yes   Sig: Take 250 mg by mouth 2 times daily   ondansetron (ZOFRAN-ODT) 4 MG ODT tab   Yes Yes   Sig: Take 4 mg by mouth every 8 hours as needed for nausea      Facility-Administered Medications: None     Allergies   No Known  Allergies    Social History   I have reviewed this patient's social history and updated it with pertinent information if needed. Chuy Barrera  reports that  has never smoked. He does not have any smokeless tobacco history on file. He reports that he does not drink alcohol or use drugs.    Family History   I have reviewed this patient's family history and updated it with pertinent information if needed.   Family History   Problem Relation Age of Onset     C.A.D. Maternal Grandfather        Review of Systems   Review of systems not obtained due to patient factors - confusion and dementia    Physical Exam   Temp: 98.4  F (36.9  C) Temp src: Oral Temp  Min: 98.4  F (36.9  C)  Max: 98.4  F (36.9  C) BP: 106/65 Pulse: 109 Heart Rate: 102 Resp: 14 SpO2: 98 % O2 Device: None (Room air)    Vital Signs with Ranges  Temp:  [98.4  F (36.9  C)] 98.4  F (36.9  C)  Pulse:  [] 109  Heart Rate:  [102-118] 102  Resp:  [14-32] 14  BP: ()/(54-77) 106/65  SpO2:  [91 %-100 %] 98 %  200 lbs 0 oz    Constitutional: demented, somewhat combative  Eyes: PERRL  ENT: dry mucous membranes in mouth, no adenopathy  Respiratory: Lungs clear  Cardiovascular: RRR, no obvious mumurs  GI: soft, nontender, good bowel sounds  Skin: no mottling, warm  Neurologic: cranial nerves grossly intact, uncooperative    Data   Results for orders placed or performed during the hospital encounter of 12/20/18 (from the past 24 hour(s))   CBC with platelets differential   Result Value Ref Range    WBC 15.2 (H) 4.0 - 11.0 10e9/L    RBC Count 3.83 (L) 4.4 - 5.9 10e12/L    Hemoglobin 12.6 (L) 13.3 - 17.7 g/dL    Hematocrit 36.2 (L) 40.0 - 53.0 %    MCV 95 78 - 100 fl    MCH 32.9 26.5 - 33.0 pg    MCHC 34.8 31.5 - 36.5 g/dL    RDW 13.9 10.0 - 15.0 %    Platelet Count 170 150 - 450 10e9/L    Diff Method Automated Method     % Neutrophils 95.0 %    % Lymphocytes 1.5 %    % Monocytes 2.9 %    % Eosinophils 0.0 %    % Basophils 0.1 %    % Immature Granulocytes  0.5 %    Nucleated RBCs 0 0 /100    Absolute Neutrophil 14.4 (H) 1.6 - 8.3 10e9/L    Absolute Lymphocytes 0.2 (L) 0.8 - 5.3 10e9/L    Absolute Monocytes 0.4 0.0 - 1.3 10e9/L    Absolute Eosinophils 0.0 0.0 - 0.7 10e9/L    Absolute Basophils 0.0 0.0 - 0.2 10e9/L    Abs Immature Granulocytes 0.1 0 - 0.4 10e9/L    Absolute Nucleated RBC 0.0    Basic metabolic panel   Result Value Ref Range    Sodium 145 (H) 133 - 144 mmol/L    Potassium 3.5 3.4 - 5.3 mmol/L    Chloride 110 (H) 94 - 109 mmol/L    Carbon Dioxide 25 20 - 32 mmol/L    Anion Gap 10 3 - 14 mmol/L    Glucose 111 (H) 70 - 99 mg/dL    Urea Nitrogen 29 7 - 30 mg/dL    Creatinine 1.15 0.66 - 1.25 mg/dL    GFR Estimate 57 (L) >60 mL/min/[1.73_m2]    GFR Estimate If Black 66 >60 mL/min/[1.73_m2]    Calcium 8.9 8.5 - 10.1 mg/dL   Lactic acid whole blood   Result Value Ref Range    Lactic Acid 2.8 (H) 0.7 - 2.0 mmol/L   XR Chest 2 Views    Narrative    CHEST TWO VIEWS   12/20/2018 10:05 PM     HISTORY:  Fever, hypotension.    COMPARISON: 1/17/2018      Impression    IMPRESSION: Basilar opacities likely represent atelectasis. Previously  demonstrated right lower lobe pneumonia has resolved. Heart size is  unchanged.    EVE REYES MD   UA with Microscopic   Result Value Ref Range    Color Urine Yellow     Appearance Urine Cloudy     Glucose Urine Negative NEG^Negative mg/dL    Bilirubin Urine Negative NEG^Negative    Ketones Urine 5 (A) NEG^Negative mg/dL    Specific Gravity Urine 1.016 1.003 - 1.035    Blood Urine Large (A) NEG^Negative    pH Urine 5.5 5.0 - 7.0 pH    Protein Albumin Urine 30 (A) NEG^Negative mg/dL    Urobilinogen mg/dL Normal 0.0 - 2.0 mg/dL    Nitrite Urine Positive (A) NEG^Negative    Leukocyte Esterase Urine Large (A) NEG^Negative    Source Catheterized Urine     WBC Urine >182 (H) 0 - 5 /HPF    RBC Urine 112 (H) 0 - 2 /HPF    WBC Clumps Present (A) NEG^Negative /HPF    Bacteria Urine Few (A) NEG^Negative /HPF    Mucous Urine Present (A)  NEG^Negative /LPF   Urine Culture   Result Value Ref Range    Specimen Description Catheterized Urine     Special Requests Specimen received in preservative     Culture Micro PENDING    Chest  XR, 1 view PORTABLE    Narrative    XR CHEST PORT 1 VW  12/21/2018 12:10 AM     INDICATION: Status post right IJ line.    COMPARISON: 12/20/2018 at 2205 hours.      Impression    IMPRESSION: New right IJ line with tip at the caval-atrial junction.  No pneumothorax. No other significant change.   Lactic acid   Result Value Ref Range    Lactic Acid 4.1 (HH) 0.4 - 2.0 mmol/L   CT Abdomen Pelvis w Contrast    Narrative    CT ABDOMEN PELVIS W CONTRAST  12/21/2018 1:35 AM     HISTORY: Nausea, vomiting; septic shock, unclear source.    TECHNIQUE: Volumetric acquisition through abdomen and pelvis with IV  contrast. 101 mL Isovue-370. Radiation dose for this scan was reduced  using automated exposure control, adjustment of the mA and/or kV  according to patient size, or iterative reconstruction technique.    COMPARISON: 1/17/2018.    FINDINGS: Gallbladder is mildly distended and contains numerous  stones. There is trace pericholecystic fluid between the liver and  gallbladder or mild gallbladder wall thickening. Liver, spleen,  pancreas, adrenal glands and kidneys demonstrate no worrisome  findings.    Mild atelectasis and/or scarring at the lung bases. Coronary artery  calcifications.    Marked prostatic enlargement. Urinary bladder is thick-walled  diffusely but not well distended. Aviles catheter balloon is within the  prostate and should be repositioned.    Extensive colonic diverticulosis without diverticulitis. No bowel  obstruction or ascites.      Impression    IMPRESSION:  1. Cholelithiasis. Trace pericholecystic fluid or gallbladder wall  thickening. Early cholecystitis cannot be excluded.  2. Urinary bladder wall is thickened which may be due to infection  and/or hypertrophy. The bladder is not distended.  3. Marked prostate  enlargement. Aviles catheter balloon is within the  prostate and should be repositioned.    Findings called to Dr. Lowery in the emergency room at 1:50 a.m.   Glucose by meter   Result Value Ref Range    Glucose 86 70 - 99 mg/dL

## 2018-12-21 NOTE — PLAN OF CARE
Discharge Planner SLP   Patient plan for discharge: not discussed  Current status: Clinical swallow evaluation completed per MD order. Patient presents with moderate-severe oropharyngeal dysphagia characterized by reduced oral bolus control and AP transit, significantly delayed or absent swallow response with limited PO trials of ice chips and nectar thick liquid by spoon. Note poor oral secretion management and congested cough and vocal quality at baseline and after PO trials. Patient appears at high risk for aspiration at this time. Recommend continue NPO with excellent oral cares. Will continue to assess for PO readiness daily. Discussed with RN and MD.  Barriers to return to prior living situation: medical acuity  Recommendations for discharge: long term care/memory care  Rationale for recommendations: short-term SLP for dysphagia for safe return to oral diet pending patient progress       Entered by: Dory Simmons 12/21/2018 1:37 PM

## 2018-12-21 NOTE — CONSULTS
Consult Date:  12/21/2018      HISTORY OF PRESENT ILLNESS:  The patient is an 86-year-old gentleman with dementia and urosepsis.  He is seen in the ICU today because of hematuria after Aviles catheter placement in the emergency room yesterday.  Unfortunately, he is not able to give any history.  His Aviles was initially placed in the emergency room and on CT scan, the Aviles balloon was found to be in the prostate, so the catheter was advanced into the bladder and confirmed on bladder ultrasound.  Since then he has had little urine output, some bleeding and clots.  Currently, there is no urine in the Aviles tubing.  I deflated the balloon and advanced the catheter once again and irrigated it with clear returns and no clots.  The Aviles was then placed to closed gravity drainage and secured to the patient's thigh.  The patient is restrained and probably does not understand any commands.      PAST MEDICAL HISTORY:  Significant for Alzheimer's disease, malignant melanoma, elevated homocysteine, history of pneumonia, wide excision of chest melanoma, hernia repair.      MEDICATIONS:  At home included Tylenol, Depakote, Zofran and Seroquel.      PHYSICAL EXAMINATION:  Today, he is disoriented and has a flat and benign abdomen.  His bladder is not distended by palpation.      On CT, the patient has gallstones.      LABORATORY DATA:  Reveal elevated white count, hemoglobin of 11.1 and normal platelets, normal electrolytes, creatinine of 1.06.  He was last seen by his primary care physician a year ago.  He has had elevated PSAs in the past including last year.  I am unable to do a rectal exam today because of the patient's positioning.      ASSESSMENT:   1.  Urosepsis with no hydronephrosis, renal mass or renal stones.  The patient has a trabeculated bladder and an enlarged prostate, so probably he has bladder outlet obstruction leading to his urinary tract infection and sepsis.  Unfortunately, he is a poor candidate for any  further evaluation and not a candidate for cystoscopy, unless this is done under sedation in the future once he recovers from his present illness.   2.  History of elevated PSAs, family history unknown.  I am not sure doing PSAs on an 86-year-old gentleman with severe dementia is his appropriate.      PLAN:  We will follow this admission.         ROBIN GARCIA JR, MD             D: 2018   T: 2018   MT: MANDA      Name:     JORDEN GARZA   MRN:      [id number]        Account:       [id number]   :      1932           Consult Date:  2018      Document: [id number]       cc: MD Piero Freed Jr, MD

## 2018-12-21 NOTE — PLAN OF CARE
Neuro: Pt alert to self. Confused and agitated at times. Redirectable mostly. Bilateral wrist restraints for pulling at lines/tubes.   CV: Pressors off at 1000am, MAP>65. Afebrile.   Pulm: 4L nc, LS clear.   GI: NPO, failed beside swallow by RN and SPL. SPL to re-assess tomorrow  : Aviles irrigated by urology, currently draining well with no new leakage on chux. Pink/red tinged urine. Low output, given 500mL bolus x2   Skin: no issues, mepilex to coccyx for prevention  Drips: TKO x2  Wife briefly at bedside and updated on POC by MD.    Will cont to monitor. Magdalene Mendieta 12/21/18 2:48 PM

## 2018-12-22 ENCOUNTER — APPOINTMENT (OUTPATIENT)
Dept: SPEECH THERAPY | Facility: CLINIC | Age: 83
DRG: 871 | End: 2018-12-22
Attending: ANESTHESIOLOGY
Payer: MEDICARE

## 2018-12-22 LAB
ERYTHROCYTE [DISTWIDTH] IN BLOOD BY AUTOMATED COUNT: 14.9 % (ref 10–15)
GLUCOSE BLDC GLUCOMTR-MCNC: 113 MG/DL (ref 70–99)
GLUCOSE BLDC GLUCOMTR-MCNC: 138 MG/DL (ref 70–99)
GLUCOSE BLDC GLUCOMTR-MCNC: 80 MG/DL (ref 70–99)
GLUCOSE BLDC GLUCOMTR-MCNC: 84 MG/DL (ref 70–99)
GLUCOSE BLDC GLUCOMTR-MCNC: 94 MG/DL (ref 70–99)
HCT VFR BLD AUTO: 30.2 % (ref 40–53)
HGB BLD-MCNC: 10.1 G/DL (ref 13.3–17.7)
HGB BLD-MCNC: 10.3 G/DL (ref 13.3–17.7)
LACTATE BLD-SCNC: 3 MMOL/L (ref 0.7–2)
MCH RBC QN AUTO: 32.7 PG (ref 26.5–33)
MCHC RBC AUTO-ENTMCNC: 34.1 G/DL (ref 31.5–36.5)
MCV RBC AUTO: 96 FL (ref 78–100)
PLATELET # BLD AUTO: 121 10E9/L (ref 150–450)
RBC # BLD AUTO: 3.15 10E12/L (ref 4.4–5.9)
WBC # BLD AUTO: 25.5 10E9/L (ref 4–11)

## 2018-12-22 PROCEDURE — 85027 COMPLETE CBC AUTOMATED: CPT | Performed by: INTERNAL MEDICINE

## 2018-12-22 PROCEDURE — 20000003 ZZH R&B ICU

## 2018-12-22 PROCEDURE — 92526 ORAL FUNCTION THERAPY: CPT | Mod: GN | Performed by: SPEECH-LANGUAGE PATHOLOGIST

## 2018-12-22 PROCEDURE — 25000128 H RX IP 250 OP 636: Performed by: INTERNAL MEDICINE

## 2018-12-22 PROCEDURE — 83605 ASSAY OF LACTIC ACID: CPT | Performed by: NURSE PRACTITIONER

## 2018-12-22 PROCEDURE — A9270 NON-COVERED ITEM OR SERVICE: HCPCS | Mod: GY | Performed by: ANESTHESIOLOGY

## 2018-12-22 PROCEDURE — 00000146 ZZHCL STATISTIC GLUCOSE BY METER IP

## 2018-12-22 PROCEDURE — 40000225 ZZH STATISTIC SLP WARD VISIT: Performed by: SPEECH-LANGUAGE PATHOLOGIST

## 2018-12-22 PROCEDURE — 25000128 H RX IP 250 OP 636: Performed by: ANESTHESIOLOGY

## 2018-12-22 PROCEDURE — 99223 1ST HOSP IP/OBS HIGH 75: CPT | Mod: AI | Performed by: INTERNAL MEDICINE

## 2018-12-22 PROCEDURE — 36415 COLL VENOUS BLD VENIPUNCTURE: CPT | Performed by: INTERNAL MEDICINE

## 2018-12-22 PROCEDURE — 25000125 ZZHC RX 250: Performed by: INTERNAL MEDICINE

## 2018-12-22 PROCEDURE — 85018 HEMOGLOBIN: CPT | Performed by: INTERNAL MEDICINE

## 2018-12-22 PROCEDURE — 25000132 ZZH RX MED GY IP 250 OP 250 PS 637: Mod: GY | Performed by: INTERNAL MEDICINE

## 2018-12-22 PROCEDURE — 87040 BLOOD CULTURE FOR BACTERIA: CPT | Performed by: INTERNAL MEDICINE

## 2018-12-22 PROCEDURE — A9270 NON-COVERED ITEM OR SERVICE: HCPCS | Mod: GY | Performed by: INTERNAL MEDICINE

## 2018-12-22 PROCEDURE — 25000132 ZZH RX MED GY IP 250 OP 250 PS 637: Mod: GY | Performed by: ANESTHESIOLOGY

## 2018-12-22 PROCEDURE — 99291 CRITICAL CARE FIRST HOUR: CPT | Performed by: NURSE PRACTITIONER

## 2018-12-22 PROCEDURE — 25000128 H RX IP 250 OP 636: Performed by: NURSE PRACTITIONER

## 2018-12-22 RX ORDER — MORPHINE SULFATE 2 MG/ML
1-2 INJECTION, SOLUTION INTRAMUSCULAR; INTRAVENOUS
Status: DISCONTINUED | OUTPATIENT
Start: 2018-12-22 | End: 2018-12-24 | Stop reason: HOSPADM

## 2018-12-22 RX ORDER — DEXTROSE MONOHYDRATE 50 MG/ML
INJECTION, SOLUTION INTRAVENOUS CONTINUOUS
Status: DISCONTINUED | OUTPATIENT
Start: 2018-12-22 | End: 2018-12-22

## 2018-12-22 RX ORDER — DEXTROSE, SODIUM CHLORIDE, SODIUM LACTATE, POTASSIUM CHLORIDE, AND CALCIUM CHLORIDE 5; .6; .31; .03; .02 G/100ML; G/100ML; G/100ML; G/100ML; G/100ML
INJECTION, SOLUTION INTRAVENOUS CONTINUOUS
Status: DISCONTINUED | OUTPATIENT
Start: 2018-12-22 | End: 2018-12-22

## 2018-12-22 RX ORDER — DIVALPROEX SODIUM 250 MG/1
250 TABLET, DELAYED RELEASE ORAL 2 TIMES DAILY
Status: DISCONTINUED | OUTPATIENT
Start: 2018-12-22 | End: 2018-12-24 | Stop reason: HOSPADM

## 2018-12-22 RX ORDER — SALIVA STIMULANT COMB. NO.3
2 SPRAY, NON-AEROSOL (ML) MUCOUS MEMBRANE
Status: DISCONTINUED | OUTPATIENT
Start: 2018-12-22 | End: 2018-12-24 | Stop reason: HOSPADM

## 2018-12-22 RX ORDER — DIVALPROEX SODIUM 250 MG/1
250 TABLET, DELAYED RELEASE ORAL 2 TIMES DAILY
Status: DISCONTINUED | OUTPATIENT
Start: 2018-12-22 | End: 2018-12-22

## 2018-12-22 RX ORDER — GLYCOPYRROLATE 0.2 MG/ML
.2-.4 INJECTION, SOLUTION INTRAMUSCULAR; INTRAVENOUS EVERY 4 HOURS PRN
Status: DISCONTINUED | OUTPATIENT
Start: 2018-12-22 | End: 2018-12-24 | Stop reason: HOSPADM

## 2018-12-22 RX ORDER — LORAZEPAM 2 MG/ML
.5-1 INJECTION INTRAMUSCULAR
Status: DISCONTINUED | OUTPATIENT
Start: 2018-12-22 | End: 2018-12-23

## 2018-12-22 RX ORDER — ACETAMINOPHEN 650 MG/1
650 SUPPOSITORY RECTAL EVERY 6 HOURS PRN
Status: DISCONTINUED | OUTPATIENT
Start: 2018-12-22 | End: 2018-12-24 | Stop reason: HOSPADM

## 2018-12-22 RX ORDER — MINERAL OIL/HYDROPHIL PETROLAT
OINTMENT (GRAM) TOPICAL EVERY 8 HOURS PRN
Status: DISCONTINUED | OUTPATIENT
Start: 2018-12-22 | End: 2018-12-24 | Stop reason: HOSPADM

## 2018-12-22 RX ORDER — NALOXONE HYDROCHLORIDE 0.4 MG/ML
.1-.4 INJECTION, SOLUTION INTRAMUSCULAR; INTRAVENOUS; SUBCUTANEOUS
Status: DISCONTINUED | OUTPATIENT
Start: 2018-12-22 | End: 2018-12-24 | Stop reason: HOSPADM

## 2018-12-22 RX ORDER — ONDANSETRON 4 MG/1
4 TABLET, ORALLY DISINTEGRATING ORAL EVERY 6 HOURS PRN
Status: DISCONTINUED | OUTPATIENT
Start: 2018-12-22 | End: 2018-12-24 | Stop reason: HOSPADM

## 2018-12-22 RX ORDER — BISACODYL 10 MG
10 SUPPOSITORY, RECTAL RECTAL
Status: DISCONTINUED | OUTPATIENT
Start: 2018-12-25 | End: 2018-12-24 | Stop reason: HOSPADM

## 2018-12-22 RX ORDER — QUETIAPINE FUMARATE 25 MG/1
25 TABLET, FILM COATED ORAL EVERY MORNING
Status: DISCONTINUED | OUTPATIENT
Start: 2018-12-22 | End: 2018-12-24 | Stop reason: HOSPADM

## 2018-12-22 RX ORDER — AMOXICILLIN 250 MG
1 CAPSULE ORAL 2 TIMES DAILY
Status: DISCONTINUED | OUTPATIENT
Start: 2018-12-22 | End: 2018-12-24 | Stop reason: HOSPADM

## 2018-12-22 RX ORDER — ACETAMINOPHEN 325 MG/1
650 TABLET ORAL EVERY 6 HOURS PRN
Status: DISCONTINUED | OUTPATIENT
Start: 2018-12-22 | End: 2018-12-24 | Stop reason: HOSPADM

## 2018-12-22 RX ORDER — SODIUM CHLORIDE 9 MG/ML
INJECTION, SOLUTION INTRAVENOUS CONTINUOUS
Status: DISCONTINUED | OUTPATIENT
Start: 2018-12-22 | End: 2018-12-23

## 2018-12-22 RX ORDER — SODIUM CHLORIDE, SODIUM LACTATE, POTASSIUM CHLORIDE, CALCIUM CHLORIDE 600; 310; 30; 20 MG/100ML; MG/100ML; MG/100ML; MG/100ML
INJECTION, SOLUTION INTRAVENOUS CONTINUOUS
Status: DISCONTINUED | OUTPATIENT
Start: 2018-12-22 | End: 2018-12-22

## 2018-12-22 RX ADMIN — VALPROATE SODIUM 130 MG: 100 INJECTION, SOLUTION INTRAVENOUS at 04:20

## 2018-12-22 RX ADMIN — PIPERACILLIN SODIUM,TAZOBACTAM SODIUM 3.38 G: 3; .375 INJECTION, POWDER, FOR SOLUTION INTRAVENOUS at 04:20

## 2018-12-22 RX ADMIN — ENOXAPARIN SODIUM 40 MG: 40 INJECTION SUBCUTANEOUS at 08:11

## 2018-12-22 RX ADMIN — HYDROCORTISONE SODIUM SUCCINATE 50 MG: 100 INJECTION, POWDER, FOR SOLUTION INTRAMUSCULAR; INTRAVENOUS at 08:11

## 2018-12-22 RX ADMIN — DEXTROSE MONOHYDRATE: 50 INJECTION, SOLUTION INTRAVENOUS at 08:12

## 2018-12-22 RX ADMIN — HYDROCORTISONE SODIUM SUCCINATE 50 MG: 100 INJECTION, POWDER, FOR SOLUTION INTRAMUSCULAR; INTRAVENOUS at 04:21

## 2018-12-22 RX ADMIN — FUROSEMIDE 20 MG: 10 INJECTION, SOLUTION INTRAVENOUS at 08:12

## 2018-12-22 RX ADMIN — SODIUM CHLORIDE, POTASSIUM CHLORIDE, SODIUM LACTATE AND CALCIUM CHLORIDE 500 ML: 600; 310; 30; 20 INJECTION, SOLUTION INTRAVENOUS at 17:37

## 2018-12-22 RX ADMIN — VALPROATE SODIUM 130 MG: 100 INJECTION, SOLUTION INTRAVENOUS at 10:17

## 2018-12-22 RX ADMIN — SODIUM CHLORIDE: 9 INJECTION, SOLUTION INTRAVENOUS at 18:25

## 2018-12-22 RX ADMIN — SENNOSIDES AND DOCUSATE SODIUM 1 TABLET: 8.6; 5 TABLET ORAL at 21:27

## 2018-12-22 RX ADMIN — SODIUM CHLORIDE: 9 INJECTION, SOLUTION INTRAVENOUS at 03:41

## 2018-12-22 RX ADMIN — QUETIAPINE 25 MG: 25 TABLET ORAL at 14:32

## 2018-12-22 RX ADMIN — DIVALPROEX SODIUM 250 MG: 250 TABLET, DELAYED RELEASE ORAL at 21:27

## 2018-12-22 RX ADMIN — MORPHINE SULFATE 2 MG: 2 INJECTION, SOLUTION INTRAMUSCULAR; INTRAVENOUS at 18:29

## 2018-12-22 RX ADMIN — PIPERACILLIN SODIUM,TAZOBACTAM SODIUM 3.38 G: 3; .375 INJECTION, POWDER, FOR SOLUTION INTRAVENOUS at 15:31

## 2018-12-22 RX ADMIN — PIPERACILLIN SODIUM,TAZOBACTAM SODIUM 3.38 G: 3; .375 INJECTION, POWDER, FOR SOLUTION INTRAVENOUS at 10:24

## 2018-12-22 ASSESSMENT — ACTIVITIES OF DAILY LIVING (ADL)
ADLS_ACUITY_SCORE: 26
ADLS_ACUITY_SCORE: 33
ADLS_ACUITY_SCORE: 26
ADLS_ACUITY_SCORE: 26

## 2018-12-22 NOTE — PLAN OF CARE
Pt remains clinically and hemodynamically stable. Pt is restless and even combative at times. Restraints in place for pulling at lines and tubes. Sit at bedside for agitation. Precedex attempted on pt but pt's BP did not tolerate. Valproic Acid ordered and given. Urine continues to be red and bloody. UOP adequate. Hemoglobin stable. Pt in first degree AVB, does have runs of tachycardia with agitation. Plan to transfer to medical floor today.

## 2018-12-22 NOTE — SIGNIFICANT EVENT
"This writer spoke with the patient's spouse who makes decisions for her .  She stated that she did not wish him to receive aggressive medical treatment when admitted.  She asked that all IV antibiotics be stopped at the end of the day on Friday 12/21/18.  She indicated that she would like her  to be made comfort care, to not \"prolong his illness and to avoid further agony\" for her .  She repeated that she does not want further aggressive treatment to be performed for life saving measures.    Spouse, Allison Barrera, also gave verbal permission to leave a message on her home phone with regard to any changes in her husbands condition.  She states she sometimes does not hear the phone when sleeping and wanted to assure staff that a message form of communication is sufficient.  This writer will follow up with the primary service for further orders.  "

## 2018-12-22 NOTE — PLAN OF CARE
Pt. Oriented only to self. Continues to need wrist restraints as will pull on all lines. Urine remains bloody as per report given and Dr. Corona aware.  Aviles irrigated times one secondary to clots and returned urine with bloody urine.  Urine output--see flow sheet.  Vitals remain stable BP mean greater than  65.  Oxygen on at 4 liters and at times in mouth per pt request and keeps sats 90 or greater.

## 2018-12-22 NOTE — CONSULTS
[facility]  [place] of Care Note - Hospitalist Service     Date of Admission:  12/20/2018    Assessment & Plan   Chuy Barrera is a 86 year old male with a history of advanced dementia who presented to the ED on 12/20/2018 with N/V/D and a fever.  He was found to have a UTI and unfortunately developed hypotension.  There was initially concern for septic shock so the patient was admitted to the intensivists.  He was treated with Levophed and Vasopressin for pressure support but after treatment of IV antibiotics these were able to be stopped.  On 12/22/2018 the hospitalist service asked to assume care as the patient's blood pressures were improved.      UTI w/Sepsis & Septic shock   E. Coli bacteremia  Traumatic freitas insertion   Patient presented from nursing home with N/V/D and fevers.  He was found to have a UTI on evaluation in the ED.  On presentation patient was hypotensive.  He was initially fluid resuscitated but ultimately required presser support with Vasopressin and Levophed and admission to the ICU.  He was initially started on Zosyn and Vancomycin and these were de-escalated to just Zosyn.  Since then the patient's blood pressures have improved and they were able to stop the pressers and transfer care to the hospitalist service on 12/22.    After freitas insertion the patient did have some hematuria that was felt to be related to traumatic freitas insertion.    Of note, 2/2 blood cultures have grown pan-sensitive E Coli.  Urine culture has also grown 10-50k lactose fermenting gram negative rods  - Daily blood cultures  - Continue Zosyn for now  - Transfer out of the ICU to [facility] status if able  - Urology following and appreciate their recommendations.  Urine clear on CBI now      Advanced dementia   Possible dsyphage  Patient is pleasantly demented on my evaluation today.  Does have a history of agitation  - PTA Depakote had been switched to IV but able to tolerate PO so will switch back    - Speech following   - PTA Seroquel QAM       The patient's care was discussed with the Patient.    Rio Orlando DO  [facility]    ______________________________________________________________________    Chief Complaint   N/V/D and fevers     History is limited due to the patient's known advanced dementia.  History obtained on review of the EHR    History of Present Illness   Chuy Barrera is a 86 year old male with advanced dementia who presented from his nursing home on 12/20/18 with N/V/D and a fever of 102 degrees supposedly.  Per EMS reported the patient was also hypotensive and hypoxic.  Upon arrival the patient was denying any chest pain or SOB.      On my evaluation on 12/22 the patient had no complaints.  He was pleasantly demented and had no idea why he was in the hospital.  He denied any pain or difficulty breathing.     Review of Systems   Unable to obtain an adequate ROS due to the patient's advanced dementia    Past Medical History    I have reviewed this patient's medical history and updated it with pertinent information if needed.   Past Medical History:   Diagnosis Date     Late onset Alzheimer's disease with behavioral disturbance 9/20/2017     Personal history of malignant melanoma of skin        Past Surgical History   I have reviewed this patient's surgical history and updated it with pertinent information if needed.  Past Surgical History:   Procedure Laterality Date     SURGICAL HISTORY OF -   4/30/99    Wide excision chest lesion     SURGICAL HISTORY OF -   4/20/99    Bx chest lesion, Dx melanoma     SURGICAL HISTORY OF -       s/p Hernia repair     SURGICAL HISTORY OF -   12/5/00    Bx chest lesion scar, neg.       Social History   I have reviewed this patient's social history and updated it with pertinent information if needed.  Social History     Tobacco Use     Smoking status: Never Smoker   Substance Use Topics     Alcohol use: No     Frequency: Never     Drug  use: No       Family History   I have reviewed this patient's family history and updated it with pertinent information if needed.   Family History   Problem Relation Age of Onset     C.A.D. Maternal Grandfather        Medications   Medications Prior to Admission   Medication Sig Dispense Refill Last Dose     acetaminophen (TYLENOL) 500 MG tablet Take 500 mg by mouth every 6 hours as needed for fever         cholecalciferol (VITAMIN D3) 5000 units TABS tablet Take 5,000 Units by mouth daily        divalproex sodium delayed-release (DEPAKOTE) 250 MG DR tablet Take 250 mg by mouth 2 times daily        ondansetron (ZOFRAN-ODT) 4 MG ODT tab Take 4 mg by mouth every 6 hours as needed for nausea    prn     QUEtiapine (SEROQUEL) 25 MG tablet Take 25 mg by mouth every morning        QUEtiapine (SEROQUEL) 25 MG tablet Take 25 mg by mouth 2 times daily as needed        Skin Protectants, Misc. (LANTISEPTIC SKIN PROTECTANT EX) Externally apply topically 2 times daily          Allergies   No Known Allergies    Physical Exam   Vital Signs: Temp: 97.7  F (36.5  C) Temp src: Axillary BP: 114/72 Pulse: 75 Heart Rate: 93 Resp: 12 SpO2: 94 % O2 Device: Nasal cannula Oxygen Delivery: 4 LPM  Weight: 170 lbs 6.65 oz    Constitutional: Pleasantly demented.  Awake and alert.  NAD   Eyes: extra-ocular muscles intact and sclera clear  ENT: normocepalic, without obvious abnormality, atramatic  Respiratory: No obvious wheezing or rales.  No respiratory distress  Cardiovascular: RRR.  Questionable faint murmur  GI: Bowel sounds present.  No tenderness  Skin: no bruising or bleeding and no rashes  Musculoskeletal: No lower extremity edema.  No joint tenderness  Neuropsychiatric: Awake and alert.  Oriented to person only.  Moves all 4 extremities spontaneously    Data   I personally reviewed no images or EKG's today.  Results for orders placed or performed during the hospital encounter of 12/20/18 (from the past 24 hour(s))   Glucose by meter    Result Value Ref Range    Glucose 116 (H) 70 - 99 mg/dL   Glucose by meter   Result Value Ref Range    Glucose 113 (H) 70 - 99 mg/dL   Hemoglobin   Result Value Ref Range    Hemoglobin 10.1 (L) 13.3 - 17.7 g/dL   CBC with platelets   Result Value Ref Range    WBC 25.5 (H) 4.0 - 11.0 10e9/L    RBC Count 3.15 (L) 4.4 - 5.9 10e12/L    Hemoglobin 10.3 (L) 13.3 - 17.7 g/dL    Hematocrit 30.2 (L) 40.0 - 53.0 %    MCV 96 78 - 100 fl    MCH 32.7 26.5 - 33.0 pg    MCHC 34.1 31.5 - 36.5 g/dL    RDW 14.9 10.0 - 15.0 %    Platelet Count 121 (L) 150 - 450 10e9/L   Glucose by meter   Result Value Ref Range    Glucose 94 70 - 99 mg/dL   Glucose by meter   Result Value Ref Range    Glucose 80 70 - 99 mg/dL   Glucose by meter   Result Value Ref Range    Glucose 84 70 - 99 mg/dL   Lactic acid whole blood   Result Value Ref Range    Lactic Acid 3.0 (H) 0.7 - 2.0 mmol/L   Glucose by meter   Result Value Ref Range    Glucose 138 (H) 70 - 99 mg/dL

## 2018-12-22 NOTE — PROGRESS NOTES
[facility]  Critical Care Service  Progress Note  Date of Service (when I saw the patient): 12/22/2018     Main Plans for Today    SLP to do swallow eval this am  Follow-up lactate  Stop steroids  Get OOB  Transfer out of ICU if SBP ok after he gets up in chair      Assessment & Plan   Chuy Barrera is a 86 year old male who presented 12/21/18  with nausea, vomiting, diarrhea. The patient currently lives in a nursing home where he had a fever of 102. Due to his age and having a fever. Per EMS report the patient qwsas found to be hypotensive and hypoxic.  Of note the patient has advanced alzheimer's disease. Workup in ED revealed a UTI with no obstruction or hydronephrosis on CT scan. He was sent to the ICU    1. Septic shock             Now resolved  2. Probable Urine source  ecoli in BC  3. trabeculated bladder and an enlarged prostate  4. Hematuria   5. Advanced Alzheimer's  6. Aspiration risk     Neuro  1. Dementia, baseline severe   Plan:  --continue daily valproic acid  -- Delirium prevention as able    CV  Septic shock: due to UTI without obstruction    Resp:  CXR clearl    GI/Nutrition  1. No prior hx  2. Needs SLP eval    Renal  No prior hx.     ID  E coli in BC, final sens pending   Plan:  On zosyn    Endocrine  1. Stress Hyperglycemia  Plan:  --  Insulin gtt per protocol if indicated  -- Keep BG  <180 for optimal healing    Heme:  1. Hematuria    Seen by urology      General cares:  DVT Prophylaxis: Enoxaparin (Lovenox) SQ  GI Prophylaxis: Not indicated  Restraints: Restraints for medical healing needed: NO  Has 1:1 sitter  Family update by me today: No  Current lines are required for patient management  Access:  [place]  Time Spent on this Encounter   Billing:  I spent 40 minutes bedside and on the inpatient unit today managing the critical care of Chuy Barrera in relation to the issues listed in this note.  Interval History   Admitted with septic shock, now off pressors, last    Physical Exam   Temp: 97.7  F (36.5  C) Temp src: Axillary Temp  Min: 96.5  F (35.8  C)  Max: 98.6  F (37  C) BP: 99/71 Pulse: 74 Heart Rate: 74 Resp: 12 SpO2: 93 % O2 Device: Nasal cannula Oxygen Delivery: 4 LPM  Vitals:    12/20/18 2112 12/21/18 0400   Weight: 90.7 kg (200 lb) 77.3 kg (170 lb 6.7 oz)     I/O last 3 completed shifts:  In: 1929.79 [I.V.:929.79; IV Piggyback:1000]  Out: 1935 [Urine:1935]    GEN: in bed sweet in nad  EYES: PERRL, Anicteric sclera.   HEENT:  Normocephalic, atraumatic, trachea midline  CV: RRR,  PULM/CHEST: CTA non labored  GI: normal bowel sounds, soft, non-tender  : freitas catheter in place, bloody urine  EXTREMITIES: trace peripheral edema, moving all extremities, peripheral pulses intact  NEURO: Cranial nerves II-XII grossly intact, no motor-sensory deficits noted  SKIN: No rashes, sores or ulcerations  Imaging personally reviewed:      Data   Recent Labs   Lab 12/22/18  0426 12/22/18  0029 12/21/18  0638 12/21/18  0320 12/20/18  2130   WBC 25.5*  --  32.7* 28.4* 15.2*   HGB 10.3* 10.1* 11.1* 10.1* 12.6*   MCV 96  --  96 96 95   *  --  171 163 170   INR  --   --   --  1.59*  --    NA  --   --   --  144 145*   POTASSIUM  --   --   --  3.8 3.5   CHLORIDE  --   --   --  115* 110*   CO2  --   --   --  19* 25   BUN  --   --   --  26 29   CR  --   --   --  1.06 1.15   ANIONGAP  --   --   --  10 10   KAYLI  --   --   --  7.5* 8.9   GLC  --   --   --  125* 111*   ALBUMIN  --   --   --  2.4*  --    PROTTOTAL  --   --   --  5.4*  --    BILITOTAL  --   --   --  1.4*  --    ALKPHOS  --   --   --  65  --    ALT  --   --   --  78*  --    AST  --   --   --  68*  --

## 2018-12-22 NOTE — PLAN OF CARE
Discharge Planner SLP   Patient plan for discharge: Patient did not state.   Current status:  Patient was seen for swallow treatment while up in the chair. He took teaspoon amounts of nectar thick liquids by spoon and cup. Premature entry and delayed swallow response noted. Timing started to improve with trials given. Slight vocal change noted towards end of 4 oz of juice. Can not rule out penetration or silent aspiration given his delay. Decreased bolus control and oral transport with delayed swallow repsonse. Mild oral residue on the soft palate that cleared with a liquid rinse. Patient continues to be a high risk for aspiration and needs supervision assistance wih feeding. Recommend: 1. Cautiously initiate a Full liquids nectar consistency. 2. Feed only when fully alert and up in the chair, Liquids by spoon is the safest, small bites 1/2 teaspoon, and alternate liquids/solids. Hold diet if vocal changes, increased throat clearing/coughing or changes in his respiratory status.   Barriers to return to prior living situation: None  Recommendations for discharge: Return to LTC when stable.   Rationale for recommendations: Patient will need follow up at LTC for diet tolerance and advancement as indicated. Patient is below his baseline.        Entered by: Sunshine Madrid 12/22/2018 10:14 AM

## 2018-12-22 NOTE — PROGRESS NOTES
Pt. Was up in chair again and went placed back in bed pt was hypotensive.   Dr. Chan updated and Dr. Orlando updated.  500 ml. Of Lactated Ringers to be given and pt. To alina in ICU.

## 2018-12-22 NOTE — PROGRESS NOTES
Neuro: Alert to self. Calm and cooperative mostly with quick mood swings at times.   Pulmonary: diminished LS. On RA.   CV: SB/ST with occasional PVCs.   : Aviles patent with pink/red and adequate UOP.  GI: BS present. Passing flatus.   Extremities: purposeful movements. Generalized weakness. Lift to transfer to bedside chair.  Skin: mole on upper back. Generalized bruises.   Lines: R internal jugular SL.   Family: wife updated at bedside.   Plan: continue zosyn and daily BC. Awaiting for transfer out of ICU today.

## 2018-12-23 LAB
BACTERIA SPEC CULT: ABNORMAL
Lab: ABNORMAL
SPECIMEN SOURCE: ABNORMAL

## 2018-12-23 PROCEDURE — 12000000 ZZH R&B MED SURG/OB

## 2018-12-23 PROCEDURE — 25000132 ZZH RX MED GY IP 250 OP 250 PS 637: Mod: GY | Performed by: ANESTHESIOLOGY

## 2018-12-23 PROCEDURE — A9270 NON-COVERED ITEM OR SERVICE: HCPCS | Mod: GY | Performed by: ANESTHESIOLOGY

## 2018-12-23 PROCEDURE — 25000128 H RX IP 250 OP 636: Performed by: ANESTHESIOLOGY

## 2018-12-23 PROCEDURE — 99231 SBSQ HOSP IP/OBS SF/LOW 25: CPT | Performed by: INTERNAL MEDICINE

## 2018-12-23 PROCEDURE — A9270 NON-COVERED ITEM OR SERVICE: HCPCS | Mod: GY | Performed by: INTERNAL MEDICINE

## 2018-12-23 PROCEDURE — 25000132 ZZH RX MED GY IP 250 OP 250 PS 637: Mod: GY | Performed by: INTERNAL MEDICINE

## 2018-12-23 RX ORDER — LORAZEPAM 0.5 MG/1
.5-1 TABLET ORAL
Status: DISCONTINUED | OUTPATIENT
Start: 2018-12-23 | End: 2018-12-24 | Stop reason: HOSPADM

## 2018-12-23 RX ORDER — MORPHINE SULFATE 100 MG/5ML
5-10 SOLUTION ORAL
Status: DISCONTINUED | OUTPATIENT
Start: 2018-12-23 | End: 2018-12-24 | Stop reason: HOSPADM

## 2018-12-23 RX ORDER — LORAZEPAM 2 MG/ML
.5-1 INJECTION INTRAMUSCULAR
Status: DISCONTINUED | OUTPATIENT
Start: 2018-12-23 | End: 2018-12-24 | Stop reason: HOSPADM

## 2018-12-23 RX ADMIN — DIVALPROEX SODIUM 250 MG: 250 TABLET, DELAYED RELEASE ORAL at 20:45

## 2018-12-23 RX ADMIN — SENNOSIDES AND DOCUSATE SODIUM 1 TABLET: 8.6; 5 TABLET ORAL at 09:23

## 2018-12-23 RX ADMIN — MORPHINE SULFATE 2 MG: 2 INJECTION, SOLUTION INTRAMUSCULAR; INTRAVENOUS at 06:25

## 2018-12-23 RX ADMIN — QUETIAPINE 25 MG: 25 TABLET ORAL at 09:23

## 2018-12-23 RX ADMIN — SENNOSIDES AND DOCUSATE SODIUM 1 TABLET: 8.6; 5 TABLET ORAL at 20:44

## 2018-12-23 RX ADMIN — DIVALPROEX SODIUM 250 MG: 250 TABLET, DELAYED RELEASE ORAL at 09:23

## 2018-12-23 ASSESSMENT — ACTIVITIES OF DAILY LIVING (ADL)
ADLS_ACUITY_SCORE: 33

## 2018-12-23 NOTE — PROGRESS NOTES
Pt transferred to [facility] at 10am. Denies pain. Some pudding and thickened water given per pt's request. Infrequent weak cough. Aviles patent with adequate UOP. Foam dressing upper back CDI. His shirt sent with patient. Wife, Allison, updated about transfer.

## 2018-12-23 NOTE — PROGRESS NOTES
[facility]    Medicine Progress Note - Hospitalist Service       Date of Admission:  12/20/2018  Assessment & Plan   Chuy Barrera is a 86 year old male with a history of advanced dementia who presented to the ED on 12/20/2018 with N/V/D and a fever.  He was found to have a UTI and unfortunately developed hypotension.  There was initially concern for septic shock so the patient was admitted to the intensivists.  He was treated with Levophed and Vasopressin for pressure support but after treatment of IV antibiotics these were able to be stopped.  On 12/22/2018 the hospitalist service asked to assume care as the patient's blood pressures were improved.      Comfort cares  On 12/22 patient's family elected to move patient to comfort cares  - PRN Tylenol, Ativan and Morphine   - Hospice consult    Medical management prior to move to comfort cares   UTI w/Sepsis & Septic shock   E. Coli bacteremia  Traumatic freitas insertion   Patient presented from nursing home with N/V/D and fevers.  He was found to have a UTI on evaluation in the ED.  On presentation patient was hypotensive.  He was initially fluid resuscitated but ultimately required presser support with Vasopressin and Levophed and admission to the ICU.  He was initially started on Zosyn and Vancomycin and these were de-escalated to just Zosyn.  Since then the patient's blood pressures have improved and they were able to stop the pressers and transfer care to the hospitalist service on 12/22.    After freitas insertion the patient did have some hematuria that was felt to be related to traumatic freitas insertion.    Of note, 2/2 blood cultures have grown pan-sensitive E Coli.  Urine culture has also grown 10-50k lactose fermenting gram negative rods  - Daily blood cultures  - Continue Zosyn for now  - Transfer out of the ICU to [facility] status if able  - Urology following and appreciate their recommendations.  Urine clear on CBI now       Advanced dementia    Possible dsyphage  Patient is pleasantly demented on my evaluation today.  Does have a history of agitation  - PTA Depakote had been switched to IV but able to tolerate PO so will switch back   - Speech following   - PTA Seroquel QAM          Diet: Combination Diet Full Liquid Diet; Nectar Thickened Liquids (pre-thickened or use instant food thickener) (Safest by spoon no straws. )    DVT Prophylaxis: Comfort cares  Aviles Catheter: in place, indication: End of Life  Code Status: DNR/DNI      Disposition Plan   Expected discharge: 1-2 days once hospice decisions made  Entered: Rio Orlando DO 12/23/2018, 2:34 PM       The patient's care was discussed with the Care Coordinator/.    Rio Orlando DO  Hospitalist Service  [facility]    ______________________________________________________________________    Interval History   Patient seen.  He is resting comfortably.  No acute events.     Data reviewed today: I reviewed all medications, new labs and imaging results over the last 24 hours. I personally reviewed no images or EKG's today.    Physical Exam   Vital Signs: Temp: 97.5  F (36.4  C) Temp src: Axillary BP: 132/87 Pulse: 80 Heart Rate: 82 Resp: 16 SpO2: 93 % O2 Device: None (Room air)    Weight: 170 lbs 6.65 oz  General Appearance:  Resting comfortably in bed.  NAD   Respiratory: Did not examine as comfort cares  Cardiovascular: Did not examine as comfort cares  GI: Did not examine as comfort cares  Skin: Did not examine as comfort cares         Data   Recent Labs   Lab 12/22/18  0426 12/22/18  0029 12/21/18  0638 12/21/18  0320 12/20/18  2130   WBC 25.5*  --  32.7* 28.4* 15.2*   HGB 10.3* 10.1* 11.1* 10.1* 12.6*   MCV 96  --  96 96 95   *  --  171 163 170   INR  --   --   --  1.59*  --    NA  --   --   --  144 145*   POTASSIUM  --   --   --  3.8 3.5   CHLORIDE  --   --   --  115* 110*   CO2  --   --   --  19* 25   BUN  --   --   --  26 29   CR  --   --   --  1.06  1.15   ANIONGAP  --   --   --  10 10   KAYLI  --   --   --  7.5* 8.9   GLC  --   --   --  125* 111*   ALBUMIN  --   --   --  2.4*  --    PROTTOTAL  --   --   --  5.4*  --    BILITOTAL  --   --   --  1.4*  --    ALKPHOS  --   --   --  65  --    ALT  --   --   --  78*  --    AST  --   --   --  68*  --

## 2018-12-23 NOTE — PROGRESS NOTES
Pt is now comfort care and orders obtained. Pt given Morphine 2 at 1830 for comfort and restlessness.

## 2018-12-23 NOTE — PROGRESS NOTES
Pt oriented x1, confused mostly. No changes overnight. VSS on RA. Morphine given x1. Wife updated last night. Transfer pending. Will cont to closely monitor.

## 2018-12-23 NOTE — PROVIDER NOTIFICATION
Patient removed R CVC by himself. Catheter tip intact, the length 20cm. Pt stable at this time. Dr. Orlando updated and IV comfort meds switched to sublingual. VS monitoring devices all removed. Will continue to make him comfortable.

## 2018-12-23 NOTE — CONSULTS
Spoke to the wife of the patient. She understands the status of the patient and wants us to fulfil his advanced directives.   She wishes to change the direction of care to comfort care only.

## 2018-12-23 NOTE — PROGRESS NOTES
Speech Language Therapy Discharge Summary    Reason for therapy discharge:    Discharged to Comfort care status.     Progress towards therapy goal(s). See goals on Care Plan in [facility] electronic health record for goal details.  Goals not met.  Barriers to achieving goals:   Medical status, declining function. .    Therapy recommendation(s):    No further therapy is recommended.     Patient assessed to tolerate a nectar thick full liquid diet on 12/22.  For comfort, diet liberalization could be considered, however, aspiration risk is likely very high.  Continue oral cares for comfort, all eating when alert and positioned upright to encourage increased comfort/safety during intake.

## 2018-12-24 VITALS
TEMPERATURE: 96.9 F | WEIGHT: 170.42 LBS | OXYGEN SATURATION: 92 % | RESPIRATION RATE: 17 BRPM | HEIGHT: 75 IN | BODY MASS INDEX: 21.19 KG/M2 | SYSTOLIC BLOOD PRESSURE: 108 MMHG | HEART RATE: 80 BPM | DIASTOLIC BLOOD PRESSURE: 70 MMHG

## 2018-12-24 LAB
ANION GAP SERPL CALCULATED.3IONS-SCNC: 9 MMOL/L (ref 3–14)
BUN SERPL-MCNC: 20 MG/DL (ref 7–30)
CALCIUM SERPL-MCNC: 8 MG/DL (ref 8.5–10.1)
CHLORIDE SERPL-SCNC: 115 MMOL/L (ref 94–109)
CO2 SERPL-SCNC: 22 MMOL/L (ref 20–32)
CREAT SERPL-MCNC: 0.68 MG/DL (ref 0.66–1.25)
GFR SERPL CREATININE-BSD FRML MDRD: 87 ML/MIN/{1.73_M2}
GLUCOSE SERPL-MCNC: 92 MG/DL (ref 70–99)
HGB BLD-MCNC: 10.8 G/DL (ref 13.3–17.7)
POTASSIUM SERPL-SCNC: 3.4 MMOL/L (ref 3.4–5.3)
SODIUM SERPL-SCNC: 146 MMOL/L (ref 133–144)

## 2018-12-24 PROCEDURE — 25000132 ZZH RX MED GY IP 250 OP 250 PS 637: Mod: GY | Performed by: INTERNAL MEDICINE

## 2018-12-24 PROCEDURE — 80048 BASIC METABOLIC PNL TOTAL CA: CPT | Performed by: NURSE PRACTITIONER

## 2018-12-24 PROCEDURE — A9270 NON-COVERED ITEM OR SERVICE: HCPCS | Mod: GY | Performed by: INTERNAL MEDICINE

## 2018-12-24 PROCEDURE — 25000132 ZZH RX MED GY IP 250 OP 250 PS 637: Mod: GY | Performed by: ANESTHESIOLOGY

## 2018-12-24 PROCEDURE — 36415 COLL VENOUS BLD VENIPUNCTURE: CPT | Performed by: NURSE PRACTITIONER

## 2018-12-24 PROCEDURE — 85018 HEMOGLOBIN: CPT | Performed by: NURSE PRACTITIONER

## 2018-12-24 PROCEDURE — 99239 HOSP IP/OBS DSCHRG MGMT >30: CPT | Performed by: INTERNAL MEDICINE

## 2018-12-24 PROCEDURE — A9270 NON-COVERED ITEM OR SERVICE: HCPCS | Mod: GY | Performed by: ANESTHESIOLOGY

## 2018-12-24 RX ORDER — LORAZEPAM 0.5 MG/1
.5-1 TABLET ORAL
Qty: 10 TABLET | Refills: 0 | Status: SHIPPED | OUTPATIENT
Start: 2018-12-24 | End: 2019-01-23

## 2018-12-24 RX ORDER — AMOXICILLIN 250 MG
1 CAPSULE ORAL 2 TIMES DAILY PRN
Qty: 60 TABLET | Refills: 0 | Status: SHIPPED | OUTPATIENT
Start: 2018-12-24 | End: 2019-01-23

## 2018-12-24 RX ORDER — MORPHINE SULFATE 100 MG/5ML
5 SOLUTION ORAL
Qty: 15 ML | Refills: 0 | Status: SHIPPED | OUTPATIENT
Start: 2018-12-24 | End: 2018-12-27

## 2018-12-24 RX ADMIN — SENNOSIDES AND DOCUSATE SODIUM 1 TABLET: 8.6; 5 TABLET ORAL at 09:30

## 2018-12-24 RX ADMIN — DIVALPROEX SODIUM 250 MG: 250 TABLET, DELAYED RELEASE ORAL at 09:30

## 2018-12-24 RX ADMIN — QUETIAPINE 25 MG: 25 TABLET ORAL at 09:31

## 2018-12-24 ASSESSMENT — ACTIVITIES OF DAILY LIVING (ADL)
ADLS_ACUITY_SCORE: 33

## 2018-12-24 NOTE — PLAN OF CARE
Alert to self only. Pleasant, cooperative. Comfort cares, denies pain. Foam dressing over malignant melanoma on upper back. Dressing over R internal jugular site CDI.  Aviles catheter in place, Draining clear, pink/reddish urine.  Tolerating full liquid/ nectar thick diet. Turned/repositioned every 2 hours. likely discharge to previous facility on hospice in 1-2 days

## 2018-12-24 NOTE — DISCHARGE SUMMARY
[facility]  Hospitalist Discharge Summary       Date of Admission:  12/20/2018  Date of Discharge:  12/24/2018  Discharging Provider: Rio Orlando DO      Discharge Diagnoses   1. End of life cares    Medical issues managed prior to move to end of life cares  UTI w/septic shock  E Coli bacteremia  Traumatic freitas insertion   Advanced dementia with dysphagia     Follow-ups Needed After Discharge   Follow-up Appointments     Follow-up and recommended labs and tests       Follow up with [facility]               Unresulted Labs Ordered in the Past 30 Days of this Admission     Date and Time Order Name Status Description    12/22/2018 1421 Blood culture Preliminary     12/22/2018 1333 Blood culture Preliminary       These results do not need to be followed as patient is [facility]   Chuy Barrera is a 86 year old male with a history of advanced dementia who presented to the ED on 12/20/2018 with N/V/D and a fever.  He was found to have a UTI and unfortunately developed hypotension.  There was initially concern for septic shock so the patient was admitted to the intensivists.  He was treated with Levophed and Vasopressin for pressure support but after treatment of IV antibiotics these were able to be stopped.  On 12/22/2018 the hospitalist service asked to assume care as the patient's blood pressures were improved.  Patient's wife was then contacted and she elected to move the patient to [facility] as it appears that had been what he had been on prior to arrival.  It was then arranged for the patient to go back to his facility to initiate hospice    On review of the ED note it appears as if the wife was contacted and asked if she wanted to proceed with evaluation and treatment and she stated yes.  Then when he developed hypotension and needed further interventions they were unable to get ahold of the wife so emergently went ahead with central line, pressers and ICU  placement.  Once the patient was to be transferred out of the ICU she was contacted again and this time stated she would not want further antibiotics, IVF or pressers and wanted the patient moved to comfort cares.       Consultations This Hospital Stay   PHARMACY TO DOSE VANCO  PHARMACY TO DOSE VANCO  SPEECH LANGUAGE PATH ADULT IP CONSULT  UROLOGY IP CONSULT  PALLIATIVE CARE ADULT IP CONSULT  PHYSICAL THERAPY ADULT IP CONSULT  OCCUPATIONAL THERAPY ADULT IP CONSULT  SOCIAL WORK IP CONSULT    Code Status   DNR/DNI    Time Spent on this Encounter   I, Rio Orlando, personally saw the patient today and spent greater than 30 minutes discharging this patient.       Rio Orlando, DO  [facility]  ______________________________________________________________________    Physical Exam   Vital Signs: Temp: 96.9  F (36.1  C) Temp src: Oral BP: 108/70   Heart Rate: 80 Resp: 17 SpO2: 92 % O2 Device: None (Room air)    Weight: 170 lbs 6.65 oz  General Appearance: Resting comfortably.  NAD  Respiratory: Did not examine as comfort cares  Cardiovascular: Did not examine as comfort cares  GI: Did not examine as comfort cares  Skin: Did not examine as comfort cares       Primary Care Physician   Piero Wallace    Discharge Disposition   Discharged to long-term care facility  Condition at discharge: Critical    Significant Results and Procedures   Most Recent 3 CBC's:  Recent Labs   Lab Test 12/24/18  0731 12/22/18  0426 12/22/18  0029 12/21/18  0638 12/21/18  0320   WBC  --  25.5*  --  32.7* 28.4*   HGB 10.8* 10.3* 10.1* 11.1* 10.1*   MCV  --  96  --  96 96   PLT  --  121*  --  171 163     Most Recent 3 BMP's:  Recent Labs   Lab Test 12/24/18  0731 12/21/18  0320 12/20/18  2130   * 144 145*   POTASSIUM 3.4 3.8 3.5   CHLORIDE 115* 115* 110*   CO2 22 19* 25   BUN 20 26 29   CR 0.68 1.06 1.15   ANIONGAP 9 10 10   KAYLI 8.0* 7.5* 8.9   GLC 92 125* 111*   ,   Results for orders placed or performed during the  hospital encounter of 12/20/18   XR Chest 2 Views    Narrative    CHEST TWO VIEWS   12/20/2018 10:05 PM     HISTORY:  Fever, hypotension.    COMPARISON: 1/17/2018      Impression    IMPRESSION: Basilar opacities likely represent atelectasis. Previously  demonstrated right lower lobe pneumonia has resolved. Heart size is  unchanged.    EVE REYES MD   Chest  XR, 1 view PORTABLE    Narrative    XR CHEST PORT 1 VW  12/21/2018 12:10 AM     INDICATION: Status post right IJ line.    COMPARISON: 12/20/2018 at 2205 hours.      Impression    IMPRESSION: New right IJ line with tip at the caval-atrial junction.  No pneumothorax. No other significant change.    JORDEN JAMES MD   CT Abdomen Pelvis w Contrast    Narrative    CT ABDOMEN PELVIS W CONTRAST  12/21/2018 1:35 AM     HISTORY: Nausea, vomiting; septic shock, unclear source.    TECHNIQUE: Volumetric acquisition through abdomen and pelvis with IV  contrast. 101 mL Isovue-370. Radiation dose for this scan was reduced  using automated exposure control, adjustment of the mA and/or kV  according to patient size, or iterative reconstruction technique.    COMPARISON: 1/17/2018.    FINDINGS: Gallbladder is mildly distended and contains numerous  stones. There is trace pericholecystic fluid between the liver and  gallbladder or mild gallbladder wall thickening. Liver, spleen,  pancreas, adrenal glands and kidneys demonstrate no worrisome  findings.    Mild atelectasis and/or scarring at the lung bases. Coronary artery  calcifications.    Marked prostatic enlargement. Urinary bladder is thick-walled  diffusely but not well distended. Aviles catheter balloon is within the  prostate and should be repositioned.    Extensive colonic diverticulosis without diverticulitis. No bowel  obstruction or ascites.      Impression    IMPRESSION:  1. Cholelithiasis. Trace pericholecystic fluid or gallbladder wall  thickening. Early cholecystitis cannot be excluded.  2. Urinary bladder  wall is thickened which may be due to infection  and/or hypertrophy. The bladder is not distended.  3. Marked prostate enlargement. Aviles catheter balloon is within the  prostate and should be repositioned.    Findings called to Dr. Lowery in the emergency room at 1:50 a.m.    JORDEN JAMES MD       Discharge Orders      Reason for your hospital stay    Urosepsis with moves to comfort care     Follow-up and recommended labs and tests     Follow up with [facility]     DNR/DNI     Diet    Follow this diet upon discharge: Orders Placed This Encounter      Combination Diet Full Liquid Diet; Nectar Thickened Liquids (pre-thickened or use instant food thickener) (Safest by spoon no straws. )     Discharge Medications   Current Discharge Medication List      START taking these medications    Details   atropine 1 % SOLN Place 2 drops under the tongue every 2 hours as needed for secretions  Qty: 5 mL, Refills: 0    Comments: Refills by hospice  Associated Diagnoses: End of life care      LORazepam (ATIVAN) 0.5 MG tablet Take 1-2 tablets (0.5-1 mg) by mouth every 3 hours as needed for anxiety, seizures or nausea (dyspnea)  Qty: 10 tablet, Refills: 0    Comments: Refills by hospice  Associated Diagnoses: End of life care      morphine sulfate HIGH CONCENTRATE (ROXANOL) 10 mg/0.5 mL (HIGH CONC) solution Take 0.25 mLs (5 mg) by mouth every 2 hours as needed for moderate to severe pain (dyspnea)  Qty: 15 mL, Refills: 0    Comments: Refills by hospice  Associated Diagnoses: End of life care      senna-docusate (SENOKOT-S/PERICOLACE) 8.6-50 MG tablet Take 1 tablet by mouth 2 times daily as needed for constipation  Qty: 60 tablet, Refills: 0    Comments: Refills by hospice  Associated Diagnoses: End of life care         CONTINUE these medications which have NOT CHANGED    Details   acetaminophen (TYLENOL) 500 MG tablet Take 500 mg by mouth every 6 hours as needed for fever       divalproex sodium delayed-release  (DEPAKOTE) 250 MG DR tablet Take 250 mg by mouth 2 times daily      ondansetron (ZOFRAN-ODT) 4 MG ODT tab Take 4 mg by mouth every 6 hours as needed for nausea       !! QUEtiapine (SEROQUEL) 25 MG tablet Take 25 mg by mouth every morning      !! QUEtiapine (SEROQUEL) 25 MG tablet Take 25 mg by mouth 2 times daily as needed      Skin Protectants, Misc. (LANTISEPTIC SKIN PROTECTANT EX) Externally apply topically 2 times daily       !! - Potential duplicate medications found. Please discuss with provider.      STOP taking these medications       cholecalciferol (VITAMIN D3) 5000 units TABS tablet Comments:   Reason for Stopping:             Allergies   No Known Allergies

## 2018-12-24 NOTE — PLAN OF CARE
Alert to self only. Pleasant, cooperative. Comfort cares, denies pain. Foam dressing over malignant melanoma on upper back. Dressing over R internal jugular site CDI.  Aviles catheter in place, Draining clear, marcos urine.  Tolerating full liquid/ nectar thick diet. Turned/repositioned. SW consulted for discharge plans, likely discharge to previous facility on hospice.

## 2018-12-24 NOTE — PROGRESS NOTES
Patient discharged at 1230 with [facility]. Patient has no IV access, was comfortable w/ no complaints of pain. Belongings returned to patient.  Discharge instructions and medications given to transport. Report given. At time of discharge, patient condition was stable and left the unit on a stretcher.

## 2018-12-24 NOTE — CONSULTS
Care Transition Initial Assessment - LEVI  Reason For Consult: discharge planning  Met with: Family    Active Problems:    Sepsis due to urinary tract infection (H)       DATA  Lives With: facility resident      Description of Support System: Supportive, Involved  Who is your support system?: Wife    Identified issues/concerns regarding health management:       Transportation Anticipated: other (see comments)([facility] stretcher )    ASSESSMENT  Cognitive Status: unable to assess     Concerns to be addressed: Per LEVI consult for discharge planning LEVI spoke with MD who indicates pt is ready for discharge. LEVI called [facility] who indicates they can take pt back to facility. LEVI indicating that pt will be going hospice, [facility] indicating that they utilize [facility]. LEVI made call to wife lAlison and informed of discharge, SW asking about hospice, Allison indicating that she is in agreement with hospice plans and is okay with using [facility]. LEVI called [facility] and set up stretcher transport for 1230pm.   SW made call to [facility] and made referral, they will try to get out to see pt this afternoon. LEVI informed Allison of discharge time and discussed potential private cost of [facility] stretcher, Allison understands and agrees to this cost. LEVI pages MD for orders.      PLAN  Financial costs for the patient includes: HE stretcher   Patient given options and choices for discharge: discharge home with [facility]   Patient/family is agreeable to the plan?  Yes:   Patient Goals and Preferences: discharge home with [facility]   Patient anticipates discharging to: discharge home with [facility]       ADDENDUM:    SW: Orders faxed to [facility] and [facility]. PCS completed.     P: SW will follow through discharge.    LON Do

## 2019-02-05 ENCOUNTER — DOCUMENTATION ONLY (OUTPATIENT)
Dept: OTHER | Facility: CLINIC | Age: 84
End: 2019-02-05